# Patient Record
Sex: MALE | Race: WHITE | Employment: OTHER | URBAN - METROPOLITAN AREA
[De-identification: names, ages, dates, MRNs, and addresses within clinical notes are randomized per-mention and may not be internally consistent; named-entity substitution may affect disease eponyms.]

---

## 2018-04-23 ENCOUNTER — APPOINTMENT (OUTPATIENT)
Dept: MRI IMAGING | Age: 58
DRG: 045 | End: 2018-04-23
Payer: MEDICAID

## 2018-04-23 ENCOUNTER — APPOINTMENT (OUTPATIENT)
Dept: CT IMAGING | Age: 58
DRG: 045 | End: 2018-04-23
Payer: MEDICAID

## 2018-04-23 ENCOUNTER — APPOINTMENT (OUTPATIENT)
Dept: ULTRASOUND IMAGING | Age: 58
DRG: 045 | End: 2018-04-23
Payer: MEDICAID

## 2018-04-23 ENCOUNTER — HOSPITAL ENCOUNTER (INPATIENT)
Age: 58
LOS: 2 days | Discharge: HOME | DRG: 045 | End: 2018-04-25
Attending: EMERGENCY MEDICINE | Admitting: INTERNAL MEDICINE
Payer: MEDICAID

## 2018-04-23 DIAGNOSIS — I63.00 CEREBROVASCULAR ACCIDENT (CVA) DUE TO THROMBOSIS OF PRECEREBRAL ARTERY (HCC): Primary | ICD-10-CM

## 2018-04-23 DIAGNOSIS — I63.9 ACUTE CEREBROVASCULAR ACCIDENT (CVA) (HCC): ICD-10-CM

## 2018-04-23 LAB
ALBUMIN SERPL-MCNC: 3.7 G/DL (ref 3.9–4.9)
ALP BLD-CCNC: 115 U/L (ref 35–104)
ALT SERPL-CCNC: 39 U/L (ref 0–41)
AMPHETAMINE SCREEN, URINE: NORMAL
ANION GAP SERPL CALCULATED.3IONS-SCNC: 13 MEQ/L (ref 7–13)
ANISOCYTOSIS: ABNORMAL
ANTISTREPTOLYSIN-O: 87 IU/ML (ref 0–200)
AST SERPL-CCNC: 56 U/L (ref 0–40)
ATYPICAL LYMPHOCYTE RELATIVE PERCENT: 4 %
BARBITURATE SCREEN URINE: NORMAL
BASOPHILS ABSOLUTE: 0.1 K/UL (ref 0–0.2)
BASOPHILS RELATIVE PERCENT: 2 %
BENZODIAZEPINE SCREEN, URINE: NORMAL
BILIRUB SERPL-MCNC: 0.6 MG/DL (ref 0–1.2)
BILIRUBIN URINE: NEGATIVE
BLOOD, URINE: NEGATIVE
BUN BLDV-MCNC: 13 MG/DL (ref 6–20)
CALCIUM SERPL-MCNC: 9.2 MG/DL (ref 8.6–10.2)
CANNABINOID SCREEN URINE: NORMAL
CHLORIDE BLD-SCNC: 101 MEQ/L (ref 98–107)
CLARITY: CLEAR
CO2: 22 MEQ/L (ref 22–29)
COCAINE METABOLITE SCREEN URINE: NORMAL
COLOR: YELLOW
CREAT SERPL-MCNC: 0.84 MG/DL (ref 0.7–1.2)
EKG ATRIAL RATE: 77 BPM
EKG P AXIS: 19 DEGREES
EKG P-R INTERVAL: 144 MS
EKG Q-T INTERVAL: 354 MS
EKG QRS DURATION: 66 MS
EKG QTC CALCULATION (BAZETT): 400 MS
EKG R AXIS: 48 DEGREES
EKG T AXIS: 56 DEGREES
EKG VENTRICULAR RATE: 77 BPM
EOSINOPHILS ABSOLUTE: 0.2 K/UL (ref 0–0.7)
EOSINOPHILS RELATIVE PERCENT: 4 %
ETHANOL PERCENT: NORMAL G/DL
ETHANOL: <10 MG/DL (ref 0–0.08)
GFR AFRICAN AMERICAN: >60
GFR NON-AFRICAN AMERICAN: >60
GLOBULIN: 3.1 G/DL (ref 2.3–3.5)
GLUCOSE BLD-MCNC: 140 MG/DL (ref 60–115)
GLUCOSE BLD-MCNC: 140 MG/DL (ref 60–115)
GLUCOSE BLD-MCNC: 229 MG/DL (ref 74–109)
GLUCOSE URINE: 100 MG/DL
HCT VFR BLD CALC: 38.5 % (ref 42–52)
HEMOGLOBIN: 13.4 G/DL (ref 14–18)
HYPOCHROMIA: 0
KETONES, URINE: NEGATIVE MG/DL
LEUKOCYTE ESTERASE, URINE: NEGATIVE
LYMPHOCYTES ABSOLUTE: 1.9 K/UL (ref 1–4.8)
LYMPHOCYTES RELATIVE PERCENT: 44 %
Lab: NORMAL
MACROCYTES: 0
MAGNESIUM: 1.6 MG/DL (ref 1.7–2.3)
MCH RBC QN AUTO: 30.4 PG (ref 27–31.3)
MCHC RBC AUTO-ENTMCNC: 34.8 % (ref 33–37)
MCV RBC AUTO: 87.4 FL (ref 80–100)
MICROCYTES: 0
MONOCYTES ABSOLUTE: 0.1 K/UL (ref 0.2–0.8)
MONOCYTES RELATIVE PERCENT: 3 %
NEUTROPHILS ABSOLUTE: 1.7 K/UL (ref 1.4–6.5)
NEUTROPHILS RELATIVE PERCENT: 43 %
NITRITE, URINE: NEGATIVE
OPIATE SCREEN URINE: NORMAL
PDW BLD-RTO: 13.2 % (ref 11.5–14.5)
PERFORMED ON: ABNORMAL
PERFORMED ON: ABNORMAL
PH UA: 5.5 (ref 5–9)
PHENCYCLIDINE SCREEN URINE: NORMAL
PLATELET # BLD: 88 K/UL (ref 130–400)
PLATELET SLIDE REVIEW: ABNORMAL
POIKILOCYTES: 0
POLYCHROMASIA: 0
POTASSIUM SERPL-SCNC: 4.5 MEQ/L (ref 3.5–5.1)
PROTEIN UA: NEGATIVE MG/DL
RBC # BLD: 4.4 M/UL (ref 4.7–6.1)
SLIDE REVIEW: ABNORMAL
SMUDGE CELLS: 3.9
SODIUM BLD-SCNC: 136 MEQ/L (ref 132–144)
SPECIFIC GRAVITY UA: 1.02 (ref 1–1.03)
TOTAL PROTEIN: 6.8 G/DL (ref 6.4–8.1)
TROPONIN: <0.01 NG/ML (ref 0–0.01)
UROBILINOGEN, URINE: 1 E.U./DL
WBC # BLD: 3.9 K/UL (ref 4.8–10.8)

## 2018-04-23 PROCEDURE — 99285 EMERGENCY DEPT VISIT HI MDM: CPT

## 2018-04-23 PROCEDURE — 6370000000 HC RX 637 (ALT 250 FOR IP): Performed by: EMERGENCY MEDICINE

## 2018-04-23 PROCEDURE — 84484 ASSAY OF TROPONIN QUANT: CPT

## 2018-04-23 PROCEDURE — 70551 MRI BRAIN STEM W/O DYE: CPT

## 2018-04-23 PROCEDURE — 6370000000 HC RX 637 (ALT 250 FOR IP): Performed by: NURSE PRACTITIONER

## 2018-04-23 PROCEDURE — 93880 EXTRACRANIAL BILAT STUDY: CPT

## 2018-04-23 PROCEDURE — 70450 CT HEAD/BRAIN W/O DYE: CPT

## 2018-04-23 PROCEDURE — 36415 COLL VENOUS BLD VENIPUNCTURE: CPT

## 2018-04-23 PROCEDURE — 83735 ASSAY OF MAGNESIUM: CPT

## 2018-04-23 PROCEDURE — 85025 COMPLETE CBC W/AUTO DIFF WBC: CPT

## 2018-04-23 PROCEDURE — 2580000003 HC RX 258: Performed by: NURSE PRACTITIONER

## 2018-04-23 PROCEDURE — 2580000003 HC RX 258: Performed by: EMERGENCY MEDICINE

## 2018-04-23 PROCEDURE — 80053 COMPREHEN METABOLIC PANEL: CPT

## 2018-04-23 PROCEDURE — 1210000000 HC MED SURG R&B

## 2018-04-23 PROCEDURE — 70544 MR ANGIOGRAPHY HEAD W/O DYE: CPT

## 2018-04-23 PROCEDURE — 86060 ANTISTREPTOLYSIN O TITER: CPT

## 2018-04-23 PROCEDURE — G0480 DRUG TEST DEF 1-7 CLASSES: HCPCS

## 2018-04-23 PROCEDURE — 80307 DRUG TEST PRSMV CHEM ANLYZR: CPT

## 2018-04-23 PROCEDURE — 81003 URINALYSIS AUTO W/O SCOPE: CPT

## 2018-04-23 RX ORDER — ACETAMINOPHEN 325 MG/1
650 TABLET ORAL EVERY 4 HOURS PRN
Status: DISCONTINUED | OUTPATIENT
Start: 2018-04-23 | End: 2018-04-25 | Stop reason: HOSPADM

## 2018-04-23 RX ORDER — ONDANSETRON 2 MG/ML
4 INJECTION INTRAMUSCULAR; INTRAVENOUS EVERY 6 HOURS PRN
Status: DISCONTINUED | OUTPATIENT
Start: 2018-04-23 | End: 2018-04-25 | Stop reason: HOSPADM

## 2018-04-23 RX ORDER — DEXTROSE MONOHYDRATE 50 MG/ML
100 INJECTION, SOLUTION INTRAVENOUS PRN
Status: DISCONTINUED | OUTPATIENT
Start: 2018-04-23 | End: 2018-04-25 | Stop reason: HOSPADM

## 2018-04-23 RX ORDER — ASPIRIN 81 MG/1
81 TABLET ORAL DAILY
Status: DISCONTINUED | OUTPATIENT
Start: 2018-04-24 | End: 2018-04-25 | Stop reason: HOSPADM

## 2018-04-23 RX ORDER — ATORVASTATIN CALCIUM 40 MG/1
40 TABLET, FILM COATED ORAL NIGHTLY
Status: DISCONTINUED | OUTPATIENT
Start: 2018-04-23 | End: 2018-04-25 | Stop reason: HOSPADM

## 2018-04-23 RX ORDER — LISINOPRIL 20 MG/1
20 TABLET ORAL DAILY
Status: ON HOLD | COMMUNITY
End: 2021-01-01 | Stop reason: HOSPADM

## 2018-04-23 RX ORDER — GLYBURIDE 5 MG/1
10 TABLET ORAL 2 TIMES DAILY WITH MEALS
Status: ON HOLD | COMMUNITY
End: 2021-01-01 | Stop reason: HOSPADM

## 2018-04-23 RX ORDER — HYDRALAZINE HYDROCHLORIDE 20 MG/ML
10 INJECTION INTRAMUSCULAR; INTRAVENOUS EVERY 6 HOURS PRN
Status: DISCONTINUED | OUTPATIENT
Start: 2018-04-23 | End: 2018-04-25 | Stop reason: HOSPADM

## 2018-04-23 RX ORDER — ASPIRIN 325 MG
325 TABLET ORAL ONCE
Status: COMPLETED | OUTPATIENT
Start: 2018-04-23 | End: 2018-04-23

## 2018-04-23 RX ORDER — NICOTINE POLACRILEX 4 MG
15 LOZENGE BUCCAL PRN
Status: DISCONTINUED | OUTPATIENT
Start: 2018-04-23 | End: 2018-04-25 | Stop reason: HOSPADM

## 2018-04-23 RX ORDER — SODIUM CHLORIDE 0.9 % (FLUSH) 0.9 %
10 SYRINGE (ML) INJECTION PRN
Status: DISCONTINUED | OUTPATIENT
Start: 2018-04-23 | End: 2018-04-25 | Stop reason: HOSPADM

## 2018-04-23 RX ORDER — DEXTROSE MONOHYDRATE 25 G/50ML
12.5 INJECTION, SOLUTION INTRAVENOUS PRN
Status: DISCONTINUED | OUTPATIENT
Start: 2018-04-23 | End: 2018-04-25 | Stop reason: HOSPADM

## 2018-04-23 RX ORDER — LISINOPRIL 20 MG/1
20 TABLET ORAL DAILY
Status: DISCONTINUED | OUTPATIENT
Start: 2018-04-23 | End: 2018-04-25 | Stop reason: HOSPADM

## 2018-04-23 RX ORDER — 0.9 % SODIUM CHLORIDE 0.9 %
1000 INTRAVENOUS SOLUTION INTRAVENOUS ONCE
Status: COMPLETED | OUTPATIENT
Start: 2018-04-23 | End: 2018-04-23

## 2018-04-23 RX ORDER — SODIUM CHLORIDE 0.9 % (FLUSH) 0.9 %
10 SYRINGE (ML) INJECTION EVERY 12 HOURS SCHEDULED
Status: DISCONTINUED | OUTPATIENT
Start: 2018-04-23 | End: 2018-04-25 | Stop reason: HOSPADM

## 2018-04-23 RX ADMIN — SODIUM CHLORIDE 1000 ML: 9 INJECTION, SOLUTION INTRAVENOUS at 13:20

## 2018-04-23 RX ADMIN — ATORVASTATIN CALCIUM 40 MG: 40 TABLET, FILM COATED ORAL at 21:18

## 2018-04-23 RX ADMIN — LISINOPRIL 20 MG: 20 TABLET ORAL at 21:18

## 2018-04-23 RX ADMIN — INSULIN LISPRO 2 UNITS: 100 INJECTION, SOLUTION INTRAVENOUS; SUBCUTANEOUS at 17:26

## 2018-04-23 RX ADMIN — ASPIRIN 325 MG: 325 TABLET, COATED ORAL at 14:51

## 2018-04-23 RX ADMIN — Medication 10 ML: at 21:18

## 2018-04-23 ASSESSMENT — PAIN SCALES - GENERAL
PAINLEVEL_OUTOF10: 0

## 2018-04-23 ASSESSMENT — PATIENT HEALTH QUESTIONNAIRE - PHQ9: SUM OF ALL RESPONSES TO PHQ QUESTIONS 1-9: 10

## 2018-04-23 ASSESSMENT — ENCOUNTER SYMPTOMS
ABDOMINAL PAIN: 0
NAUSEA: 0
SORE THROAT: 0
COUGH: 0
SHORTNESS OF BREATH: 0
VOMITING: 0
BACK PAIN: 0
DIARRHEA: 0

## 2018-04-24 LAB
ANION GAP SERPL CALCULATED.3IONS-SCNC: 15 MEQ/L (ref 7–13)
BUN BLDV-MCNC: 12 MG/DL (ref 6–20)
CALCIUM SERPL-MCNC: 9.1 MG/DL (ref 8.6–10.2)
CHLORIDE BLD-SCNC: 102 MEQ/L (ref 98–107)
CHOLESTEROL, TOTAL: 185 MG/DL (ref 0–199)
CO2: 21 MEQ/L (ref 22–29)
CREAT SERPL-MCNC: 0.79 MG/DL (ref 0.7–1.2)
GFR AFRICAN AMERICAN: >60
GFR NON-AFRICAN AMERICAN: >60
GLUCOSE BLD-MCNC: 160 MG/DL (ref 60–115)
GLUCOSE BLD-MCNC: 164 MG/DL (ref 74–109)
GLUCOSE BLD-MCNC: 176 MG/DL (ref 60–115)
GLUCOSE BLD-MCNC: 197 MG/DL (ref 60–115)
GLUCOSE BLD-MCNC: 228 MG/DL (ref 60–115)
HCT VFR BLD CALC: 37.8 % (ref 42–52)
HDLC SERPL-MCNC: 33 MG/DL (ref 40–59)
HEMOGLOBIN: 12.9 G/DL (ref 14–18)
LDL CHOLESTEROL CALCULATED: 109 MG/DL (ref 0–129)
MCH RBC QN AUTO: 29.9 PG (ref 27–31.3)
MCHC RBC AUTO-ENTMCNC: 34.2 % (ref 33–37)
MCV RBC AUTO: 87.4 FL (ref 80–100)
PDW BLD-RTO: 13.3 % (ref 11.5–14.5)
PERFORMED ON: ABNORMAL
PLATELET # BLD: 84 K/UL (ref 130–400)
POTASSIUM REFLEX MAGNESIUM: 4.2 MEQ/L (ref 3.5–5.1)
RBC # BLD: 4.32 M/UL (ref 4.7–6.1)
SODIUM BLD-SCNC: 138 MEQ/L (ref 132–144)
TRIGL SERPL-MCNC: 214 MG/DL (ref 0–200)
WBC # BLD: 4.5 K/UL (ref 4.8–10.8)

## 2018-04-24 PROCEDURE — 6360000002 HC RX W HCPCS: Performed by: NURSE PRACTITIONER

## 2018-04-24 PROCEDURE — G8979 MOBILITY GOAL STATUS: HCPCS

## 2018-04-24 PROCEDURE — G8988 SELF CARE GOAL STATUS: HCPCS

## 2018-04-24 PROCEDURE — 1210000000 HC MED SURG R&B

## 2018-04-24 PROCEDURE — 97166 OT EVAL MOD COMPLEX 45 MIN: CPT

## 2018-04-24 PROCEDURE — G8978 MOBILITY CURRENT STATUS: HCPCS

## 2018-04-24 PROCEDURE — 80048 BASIC METABOLIC PNL TOTAL CA: CPT

## 2018-04-24 PROCEDURE — G8987 SELF CARE CURRENT STATUS: HCPCS

## 2018-04-24 PROCEDURE — 2580000003 HC RX 258: Performed by: NURSE PRACTITIONER

## 2018-04-24 PROCEDURE — 80061 LIPID PANEL: CPT

## 2018-04-24 PROCEDURE — 99253 IP/OBS CNSLTJ NEW/EST LOW 45: CPT | Performed by: CLINICAL NURSE SPECIALIST

## 2018-04-24 PROCEDURE — 6370000000 HC RX 637 (ALT 250 FOR IP): Performed by: NURSE PRACTITIONER

## 2018-04-24 PROCEDURE — 85027 COMPLETE CBC AUTOMATED: CPT

## 2018-04-24 PROCEDURE — 6370000000 HC RX 637 (ALT 250 FOR IP): Performed by: PSYCHIATRY & NEUROLOGY

## 2018-04-24 PROCEDURE — 36415 COLL VENOUS BLD VENIPUNCTURE: CPT

## 2018-04-24 PROCEDURE — 97163 PT EVAL HIGH COMPLEX 45 MIN: CPT

## 2018-04-24 PROCEDURE — 99254 IP/OBS CNSLTJ NEW/EST MOD 60: CPT | Performed by: INTERNAL MEDICINE

## 2018-04-24 RX ORDER — CLOPIDOGREL BISULFATE 75 MG/1
75 TABLET ORAL DAILY
Status: DISCONTINUED | OUTPATIENT
Start: 2018-04-24 | End: 2018-04-25 | Stop reason: HOSPADM

## 2018-04-24 RX ORDER — VENLAFAXINE HYDROCHLORIDE 37.5 MG/1
37.5 CAPSULE, EXTENDED RELEASE ORAL
Status: DISCONTINUED | OUTPATIENT
Start: 2018-04-25 | End: 2018-04-25 | Stop reason: HOSPADM

## 2018-04-24 RX ORDER — MAGNESIUM SULFATE IN WATER 40 MG/ML
2 INJECTION, SOLUTION INTRAVENOUS ONCE
Status: COMPLETED | OUTPATIENT
Start: 2018-04-24 | End: 2018-04-24

## 2018-04-24 RX ADMIN — INSULIN LISPRO 2 UNITS: 100 INJECTION, SOLUTION INTRAVENOUS; SUBCUTANEOUS at 10:28

## 2018-04-24 RX ADMIN — ASPIRIN 81 MG: 81 TABLET, COATED ORAL at 10:28

## 2018-04-24 RX ADMIN — INSULIN LISPRO 2 UNITS: 100 INJECTION, SOLUTION INTRAVENOUS; SUBCUTANEOUS at 17:11

## 2018-04-24 RX ADMIN — LISINOPRIL 20 MG: 20 TABLET ORAL at 10:28

## 2018-04-24 RX ADMIN — Medication 10 ML: at 10:35

## 2018-04-24 RX ADMIN — ATORVASTATIN CALCIUM 40 MG: 40 TABLET, FILM COATED ORAL at 20:22

## 2018-04-24 RX ADMIN — Medication 10 ML: at 20:21

## 2018-04-24 RX ADMIN — INSULIN LISPRO 4 UNITS: 100 INJECTION, SOLUTION INTRAVENOUS; SUBCUTANEOUS at 12:53

## 2018-04-24 RX ADMIN — CLOPIDOGREL BISULFATE 75 MG: 75 TABLET ORAL at 12:53

## 2018-04-24 RX ADMIN — MAGNESIUM SULFATE HEPTAHYDRATE 2 G: 40 INJECTION, SOLUTION INTRAVENOUS at 12:53

## 2018-04-24 ASSESSMENT — PAIN SCALES - GENERAL
PAINLEVEL_OUTOF10: 0

## 2018-04-24 ASSESSMENT — ENCOUNTER SYMPTOMS
APNEA: 0
EYES NEGATIVE: 1
ALLERGIC/IMMUNOLOGIC NEGATIVE: 1
RESPIRATORY NEGATIVE: 1
WHEEZING: 0
COUGH: 0
DIARRHEA: 0
STRIDOR: 0
SHORTNESS OF BREATH: 0
NAUSEA: 0
CHEST TIGHTNESS: 0
BLOOD IN STOOL: 0
VOMITING: 0
CHOKING: 0
GASTROINTESTINAL NEGATIVE: 1

## 2018-04-25 ENCOUNTER — APPOINTMENT (OUTPATIENT)
Dept: CARDIAC CATH/INVASIVE PROCEDURES | Age: 58
DRG: 045 | End: 2018-04-25
Payer: MEDICAID

## 2018-04-25 VITALS
WEIGHT: 278 LBS | RESPIRATION RATE: 19 BRPM | DIASTOLIC BLOOD PRESSURE: 60 MMHG | HEART RATE: 72 BPM | TEMPERATURE: 98.3 F | HEIGHT: 68 IN | BODY MASS INDEX: 42.13 KG/M2 | OXYGEN SATURATION: 96 % | SYSTOLIC BLOOD PRESSURE: 115 MMHG

## 2018-04-25 LAB
ANION GAP SERPL CALCULATED.3IONS-SCNC: 14 MEQ/L (ref 7–13)
BUN BLDV-MCNC: 15 MG/DL (ref 6–20)
CALCIUM SERPL-MCNC: 9.4 MG/DL (ref 8.6–10.2)
CHLORIDE BLD-SCNC: 99 MEQ/L (ref 98–107)
CO2: 22 MEQ/L (ref 22–29)
CREAT SERPL-MCNC: 0.91 MG/DL (ref 0.7–1.2)
GFR AFRICAN AMERICAN: >60
GFR NON-AFRICAN AMERICAN: >60
GLUCOSE BLD-MCNC: 208 MG/DL (ref 60–115)
GLUCOSE BLD-MCNC: 224 MG/DL (ref 74–109)
GLUCOSE BLD-MCNC: 376 MG/DL (ref 60–115)
LV EF: 60 %
LVEF MODALITY: NORMAL
MAGNESIUM: 1.7 MG/DL (ref 1.7–2.3)
PERFORMED ON: ABNORMAL
PERFORMED ON: ABNORMAL
POTASSIUM SERPL-SCNC: 4.1 MEQ/L (ref 3.5–5.1)
SODIUM BLD-SCNC: 135 MEQ/L (ref 132–144)

## 2018-04-25 PROCEDURE — 93325 DOPPLER ECHO COLOR FLOW MAPG: CPT

## 2018-04-25 PROCEDURE — 6370000000 HC RX 637 (ALT 250 FOR IP): Performed by: INTERNAL MEDICINE

## 2018-04-25 PROCEDURE — 93321 DOPPLER ECHO F-UP/LMTD STD: CPT

## 2018-04-25 PROCEDURE — 36415 COLL VENOUS BLD VENIPUNCTURE: CPT

## 2018-04-25 PROCEDURE — 80048 BASIC METABOLIC PNL TOTAL CA: CPT

## 2018-04-25 PROCEDURE — 83735 ASSAY OF MAGNESIUM: CPT

## 2018-04-25 PROCEDURE — 93312 ECHO TRANSESOPHAGEAL: CPT

## 2018-04-25 PROCEDURE — 6370000000 HC RX 637 (ALT 250 FOR IP): Performed by: NURSE PRACTITIONER

## 2018-04-25 PROCEDURE — 6370000000 HC RX 637 (ALT 250 FOR IP): Performed by: CLINICAL NURSE SPECIALIST

## 2018-04-25 PROCEDURE — 6370000000 HC RX 637 (ALT 250 FOR IP): Performed by: PSYCHIATRY & NEUROLOGY

## 2018-04-25 PROCEDURE — 97116 GAIT TRAINING THERAPY: CPT

## 2018-04-25 PROCEDURE — 6360000002 HC RX W HCPCS: Performed by: INTERNAL MEDICINE

## 2018-04-25 PROCEDURE — 93312 ECHO TRANSESOPHAGEAL: CPT | Performed by: INTERNAL MEDICINE

## 2018-04-25 PROCEDURE — 97535 SELF CARE MNGMENT TRAINING: CPT

## 2018-04-25 RX ORDER — SODIUM CHLORIDE 0.9 % (FLUSH) 0.9 %
10 SYRINGE (ML) INJECTION PRN
Status: DISCONTINUED | OUTPATIENT
Start: 2018-04-25 | End: 2018-04-25 | Stop reason: HOSPADM

## 2018-04-25 RX ORDER — SODIUM CHLORIDE 0.9 % (FLUSH) 0.9 %
10 SYRINGE (ML) INJECTION EVERY 12 HOURS SCHEDULED
Status: DISCONTINUED | OUTPATIENT
Start: 2018-04-25 | End: 2018-04-25 | Stop reason: HOSPADM

## 2018-04-25 RX ORDER — ASPIRIN 81 MG/1
81 TABLET ORAL DAILY
Qty: 30 TABLET | Refills: 3 | Status: ON HOLD | OUTPATIENT
Start: 2018-04-26 | End: 2021-01-01 | Stop reason: HOSPADM

## 2018-04-25 RX ORDER — CLOPIDOGREL BISULFATE 75 MG/1
75 TABLET ORAL DAILY
Qty: 30 TABLET | Refills: 3 | Status: ON HOLD | OUTPATIENT
Start: 2018-04-26 | End: 2021-01-01 | Stop reason: HOSPADM

## 2018-04-25 RX ORDER — SODIUM CHLORIDE 9 MG/ML
INJECTION, SOLUTION INTRAVENOUS CONTINUOUS
Status: DISCONTINUED | OUTPATIENT
Start: 2018-04-25 | End: 2018-04-25 | Stop reason: HOSPADM

## 2018-04-25 RX ORDER — MIDAZOLAM HYDROCHLORIDE 1 MG/ML
INJECTION INTRAMUSCULAR; INTRAVENOUS
Status: COMPLETED | OUTPATIENT
Start: 2018-04-25 | End: 2018-04-25

## 2018-04-25 RX ORDER — BACTERIOSTATIC SODIUM CHLORIDE 0.9 %
VIAL (ML) INJECTION
Status: DISCONTINUED
Start: 2018-04-25 | End: 2018-04-25 | Stop reason: HOSPADM

## 2018-04-25 RX ORDER — FENTANYL CITRATE 50 UG/ML
100 INJECTION, SOLUTION INTRAMUSCULAR; INTRAVENOUS ONCE
Status: DISCONTINUED | OUTPATIENT
Start: 2018-04-25 | End: 2018-04-25 | Stop reason: HOSPADM

## 2018-04-25 RX ORDER — MIDAZOLAM HYDROCHLORIDE 1 MG/ML
5 INJECTION INTRAMUSCULAR; INTRAVENOUS ONCE
Status: DISCONTINUED | OUTPATIENT
Start: 2018-04-25 | End: 2018-04-25 | Stop reason: HOSPADM

## 2018-04-25 RX ORDER — ATORVASTATIN CALCIUM 40 MG/1
40 TABLET, FILM COATED ORAL NIGHTLY
Qty: 30 TABLET | Refills: 3 | Status: ON HOLD | OUTPATIENT
Start: 2018-04-25 | End: 2021-01-01 | Stop reason: HOSPADM

## 2018-04-25 RX ORDER — VENLAFAXINE HYDROCHLORIDE 37.5 MG/1
37.5 CAPSULE, EXTENDED RELEASE ORAL
Qty: 30 CAPSULE | Refills: 3 | Status: ON HOLD | OUTPATIENT
Start: 2018-04-26 | End: 2021-01-01 | Stop reason: HOSPADM

## 2018-04-25 RX ADMIN — ASPIRIN 81 MG: 81 TABLET, COATED ORAL at 09:53

## 2018-04-25 RX ADMIN — LISINOPRIL 20 MG: 20 TABLET ORAL at 09:53

## 2018-04-25 RX ADMIN — LIDOCAINE HYDROCHLORIDE 15 ML: 20 SOLUTION ORAL; TOPICAL at 08:15

## 2018-04-25 RX ADMIN — MIDAZOLAM HYDROCHLORIDE 2 MG: 1 INJECTION, SOLUTION INTRAMUSCULAR; INTRAVENOUS at 08:18

## 2018-04-25 RX ADMIN — FENTANYL CITRATE 50 MCG: 50 INJECTION, SOLUTION INTRAMUSCULAR; INTRAVENOUS at 08:19

## 2018-04-25 RX ADMIN — CLOPIDOGREL BISULFATE 75 MG: 75 TABLET ORAL at 09:53

## 2018-04-25 RX ADMIN — INSULIN LISPRO 4 UNITS: 100 INJECTION, SOLUTION INTRAVENOUS; SUBCUTANEOUS at 09:57

## 2018-04-25 RX ADMIN — INSULIN LISPRO 10 UNITS: 100 INJECTION, SOLUTION INTRAVENOUS; SUBCUTANEOUS at 11:49

## 2018-04-25 RX ADMIN — VENLAFAXINE HYDROCHLORIDE 37.5 MG: 37.5 CAPSULE, EXTENDED RELEASE ORAL at 09:53

## 2018-04-25 ASSESSMENT — PAIN SCALES - GENERAL
PAINLEVEL_OUTOF10: 0

## 2018-07-29 ENCOUNTER — HOSPITAL ENCOUNTER (EMERGENCY)
Age: 58
Discharge: HOME | End: 2018-07-29
Payer: MEDICAID

## 2018-07-29 ENCOUNTER — APPOINTMENT (OUTPATIENT)
Dept: CT IMAGING | Age: 58
End: 2018-07-29
Payer: MEDICAID

## 2018-07-29 VITALS
SYSTOLIC BLOOD PRESSURE: 125 MMHG | DIASTOLIC BLOOD PRESSURE: 70 MMHG | HEIGHT: 69 IN | BODY MASS INDEX: 40.29 KG/M2 | WEIGHT: 272 LBS | HEART RATE: 83 BPM | OXYGEN SATURATION: 98 % | RESPIRATION RATE: 16 BRPM | TEMPERATURE: 98 F

## 2018-07-29 DIAGNOSIS — S09.90XA INJURY OF HEAD, INITIAL ENCOUNTER: ICD-10-CM

## 2018-07-29 DIAGNOSIS — T14.8XXA ABRASION: Primary | ICD-10-CM

## 2018-07-29 PROCEDURE — 70450 CT HEAD/BRAIN W/O DYE: CPT

## 2018-07-29 PROCEDURE — 99283 EMERGENCY DEPT VISIT LOW MDM: CPT

## 2018-07-29 ASSESSMENT — ENCOUNTER SYMPTOMS
VOMITING: 0
NAUSEA: 0

## 2018-07-29 NOTE — ED PROVIDER NOTES
lb (123.4 kg)   Height: 5' 9\" (1.753 m)         REASSESSMENT            MDM    PROCEDURES:    Procedures      FINAL IMPRESSION      1. Abrasion    2. Injury of head, initial encounter          DISPOSITION/PLAN   DISPOSITION Decision To Discharge 07/29/2018 06:05:14 PM  Nursing notes, medical records and triage notes reviewed. Vital signs reviewed. This is a 60year old male with a PMH of multiple CVAs (on anticoagulants) who present to the emergency department for acute abrasion/scratch to forehead that he discovered this morning. CT negative for acute bleed. Recommended follow up with PCP tomorrow. Family members and patient were given the warning signs to return immediately to the ED.      The patient and/or family  -had the results of all tests and diagnosis explained to them  -Given both verbal and written discharge instructions  -Were instructed of the importance of close follow-up  -Were told that close follow-up is essential for good health and good outcomes    PATIENT REFERRED TO:  Alexus De Jesus, [address]  [address] [place] [id number] [phone]    Call in 1 day        DISCHARGE MEDICATIONS:  Discharge Medication List as of 7/29/2018  6:07 PM          (Please note that portions of this note were completed with a voice recognition program.  Efforts were made to edit the dictations but occasionally words are mis-transcribed.)    MARISELA Looney PA-C  07/29/18 2905

## 2018-07-29 NOTE — ED TRIAGE NOTES
Patient is alert and oriented X 4. Patient states he lives with family. Patient states with scratch on forehead. No bleeding at this time. Patient denies injury. Patient denies dizziness or chest shannon.  m

## 2021-01-01 ENCOUNTER — TELEPHONE (OUTPATIENT)
Dept: INTERVENTIONAL RADIOLOGY/VASCULAR | Age: 61
End: 2021-01-01

## 2021-01-01 ENCOUNTER — APPOINTMENT (OUTPATIENT)
Dept: CT IMAGING | Age: 61
DRG: 432 | End: 2021-01-01
Payer: MEDICARE

## 2021-01-01 ENCOUNTER — CARE COORDINATION (OUTPATIENT)
Dept: CASE MANAGEMENT | Age: 61
End: 2021-01-01

## 2021-01-01 ENCOUNTER — APPOINTMENT (OUTPATIENT)
Dept: GENERAL RADIOLOGY | Age: 61
DRG: 432 | End: 2021-01-01
Payer: MEDICARE

## 2021-01-01 ENCOUNTER — APPOINTMENT (OUTPATIENT)
Dept: ULTRASOUND IMAGING | Age: 61
DRG: 432 | End: 2021-01-01
Payer: MEDICARE

## 2021-01-01 ENCOUNTER — HOSPITAL ENCOUNTER (OUTPATIENT)
Dept: CT IMAGING | Age: 61
Discharge: HOME | End: 2021-04-25
Payer: MEDICARE

## 2021-01-01 ENCOUNTER — HOSPITAL ENCOUNTER (INPATIENT)
Age: 61
LOS: 5 days | Discharge: NURSING, REHAB OR LONG-TERM CARE | DRG: 432 | End: 2021-04-14
Attending: EMERGENCY MEDICINE | Admitting: INTERNAL MEDICINE
Payer: MEDICARE

## 2021-01-01 ENCOUNTER — VIRTUAL VISIT (OUTPATIENT)
Dept: INTERVENTIONAL RADIOLOGY/VASCULAR | Age: 61
End: 2021-01-01
Payer: MEDICARE

## 2021-01-01 ENCOUNTER — HOSPITAL ENCOUNTER (INPATIENT)
Age: 61
LOS: 1 days | DRG: 432 | End: 2021-05-02
Attending: FAMILY MEDICINE | Admitting: INTERNAL MEDICINE
Payer: MEDICARE

## 2021-01-01 ENCOUNTER — APPOINTMENT (OUTPATIENT)
Dept: INTERVENTIONAL RADIOLOGY/VASCULAR | Age: 61
DRG: 432 | End: 2021-01-01
Payer: MEDICARE

## 2021-01-01 VITALS
HEIGHT: 69 IN | BODY MASS INDEX: 38.51 KG/M2 | WEIGHT: 260 LBS | SYSTOLIC BLOOD PRESSURE: 124 MMHG | TEMPERATURE: 97.9 F | DIASTOLIC BLOOD PRESSURE: 77 MMHG | RESPIRATION RATE: 18 BRPM | HEART RATE: 136 BPM | OXYGEN SATURATION: 56 %

## 2021-01-01 VITALS
TEMPERATURE: 97.5 F | HEART RATE: 95 BPM | RESPIRATION RATE: 18 BRPM | WEIGHT: 240 LBS | HEIGHT: 69 IN | SYSTOLIC BLOOD PRESSURE: 118 MMHG | BODY MASS INDEX: 35.55 KG/M2 | DIASTOLIC BLOOD PRESSURE: 78 MMHG | OXYGEN SATURATION: 95 %

## 2021-01-01 VITALS
HEART RATE: 91 BPM | DIASTOLIC BLOOD PRESSURE: 87 MMHG | OXYGEN SATURATION: 98 % | SYSTOLIC BLOOD PRESSURE: 144 MMHG | RESPIRATION RATE: 18 BRPM

## 2021-01-01 DIAGNOSIS — R93.5 ABNORMAL CT OF THE ABDOMEN: ICD-10-CM

## 2021-01-01 DIAGNOSIS — N28.9 RENAL INSUFFICIENCY: ICD-10-CM

## 2021-01-01 DIAGNOSIS — G93.41 METABOLIC ENCEPHALOPATHY: Primary | ICD-10-CM

## 2021-01-01 DIAGNOSIS — R41.0 INTERMITTENT CONFUSION: ICD-10-CM

## 2021-01-01 DIAGNOSIS — R53.1 GENERALIZED WEAKNESS: ICD-10-CM

## 2021-01-01 DIAGNOSIS — K74.60 CIRRHOSIS OF LIVER WITH ASCITES, UNSPECIFIED HEPATIC CIRRHOSIS TYPE (HCC): ICD-10-CM

## 2021-01-01 DIAGNOSIS — R59.0 RETROPERITONEAL LYMPHADENOPATHY: Primary | ICD-10-CM

## 2021-01-01 DIAGNOSIS — R59.0 RETROPERITONEAL LYMPHADENOPATHY: ICD-10-CM

## 2021-01-01 DIAGNOSIS — C79.9 METASTATIC CARCINOMA (HCC): ICD-10-CM

## 2021-01-01 DIAGNOSIS — N39.0 ACUTE UTI: ICD-10-CM

## 2021-01-01 DIAGNOSIS — R18.8 CIRRHOSIS OF LIVER WITH ASCITES, UNSPECIFIED HEPATIC CIRRHOSIS TYPE (HCC): ICD-10-CM

## 2021-01-01 DIAGNOSIS — R18.8 ASCITES OF LIVER: Primary | ICD-10-CM

## 2021-01-01 LAB
AFP: 441.6 UG/L
ALBUMIN FLUID: 0.9 G/DL
ALBUMIN SERPL-MCNC: 2.6 G/DL (ref 3.5–4.6)
ALBUMIN SERPL-MCNC: 2.89 G/DL (ref 3.75–5.01)
ALBUMIN SERPL-MCNC: 2.9 G/DL (ref 3.5–4.6)
ALBUMIN SERPL-MCNC: 3 G/DL (ref 3.5–4.6)
ALBUMIN SERPL-MCNC: 3 G/DL (ref 3.5–4.6)
ALBUMIN SERPL-MCNC: 3.2 G/DL (ref 3.5–4.6)
ALP BLD-CCNC: 142 U/L (ref 35–104)
ALP BLD-CCNC: 194 U/L (ref 35–104)
ALP BLD-CCNC: 209 U/L (ref 35–104)
ALP BLD-CCNC: 236 U/L (ref 35–104)
ALP BLD-CCNC: 244 U/L (ref 35–104)
ALPHA-1-GLOBULIN: 0.38 G/DL (ref 0.19–0.46)
ALPHA-2-GLOBULIN: 0.61 G/DL (ref 0.48–1.05)
ALT SERPL-CCNC: 15 U/L (ref 0–41)
ALT SERPL-CCNC: 18 U/L (ref 0–41)
ALT SERPL-CCNC: 19 U/L (ref 0–41)
ALT SERPL-CCNC: 25 U/L (ref 0–41)
ALT SERPL-CCNC: 26 U/L (ref 0–41)
AMMONIA: 115 UMOL/L (ref 16–60)
AMMONIA: 313 UMOL/L (ref 16–60)
AMMONIA: 45 UMOL/L (ref 16–60)
AMMONIA: 73 UMOL/L (ref 16–60)
AMYLASE FLUID: 135 U/L
ANION GAP SERPL CALCULATED.3IONS-SCNC: 10 MEQ/L (ref 9–15)
ANION GAP SERPL CALCULATED.3IONS-SCNC: 11 MEQ/L (ref 9–15)
ANION GAP SERPL CALCULATED.3IONS-SCNC: 12 MEQ/L (ref 9–15)
ANION GAP SERPL CALCULATED.3IONS-SCNC: 12 MEQ/L (ref 9–15)
ANION GAP SERPL CALCULATED.3IONS-SCNC: 14 MEQ/L (ref 9–15)
ANION GAP SERPL CALCULATED.3IONS-SCNC: 15 MEQ/L (ref 9–15)
ANION GAP SERPL CALCULATED.3IONS-SCNC: 16 MEQ/L (ref 9–15)
ANION GAP SERPL CALCULATED.3IONS-SCNC: 19 MEQ/L (ref 9–15)
APPEARANCE FLUID: ABNORMAL
AST SERPL-CCNC: 36 U/L (ref 0–40)
AST SERPL-CCNC: 40 U/L (ref 0–40)
AST SERPL-CCNC: 40 U/L (ref 0–40)
AST SERPL-CCNC: 52 U/L (ref 0–40)
AST SERPL-CCNC: 74 U/L (ref 0–40)
BACTERIA: NEGATIVE /HPF
BASE EXCESS ARTERIAL: -6 (ref -3–3)
BASOPHILS ABSOLUTE: 0 K/UL (ref 0–0.2)
BASOPHILS RELATIVE PERCENT: 0.2 %
BASOPHILS RELATIVE PERCENT: 0.3 %
BASOPHILS RELATIVE PERCENT: 0.3 %
BASOPHILS RELATIVE PERCENT: 0.5 %
BETA GLOBULIN: 0.83 G/DL (ref 0.48–1.1)
BILIRUB SERPL-MCNC: 1.6 MG/DL (ref 0.2–0.7)
BILIRUB SERPL-MCNC: 1.7 MG/DL (ref 0.2–0.7)
BILIRUB SERPL-MCNC: 2.5 MG/DL (ref 0.2–0.7)
BILIRUB SERPL-MCNC: 2.6 MG/DL (ref 0.2–0.7)
BILIRUB SERPL-MCNC: 3 MG/DL (ref 0.2–0.7)
BILIRUBIN URINE: ABNORMAL
BILIRUBIN URINE: NEGATIVE
BLOOD CULTURE, ROUTINE: NORMAL
BLOOD, URINE: ABNORMAL
BLOOD, URINE: NEGATIVE
BODY FLUID CULTURE, STERILE: NORMAL
BUN BLDV-MCNC: 25 MG/DL (ref 8–23)
BUN BLDV-MCNC: 30 MG/DL (ref 8–23)
BUN BLDV-MCNC: 30 MG/DL (ref 8–23)
BUN BLDV-MCNC: 32 MG/DL (ref 8–23)
BUN BLDV-MCNC: 32 MG/DL (ref 8–23)
BUN BLDV-MCNC: 33 MG/DL (ref 8–23)
BUN BLDV-MCNC: 33 MG/DL (ref 8–23)
BUN BLDV-MCNC: 35 MG/DL (ref 8–23)
BUN BLDV-MCNC: 56 MG/DL (ref 8–23)
BUN BLDV-MCNC: 66 MG/DL (ref 8–23)
CALCIUM IONIZED: 1.2 MMOL/L (ref 1.12–1.32)
CALCIUM SERPL-MCNC: 10.1 MG/DL (ref 8.5–9.9)
CALCIUM SERPL-MCNC: 10.9 MG/DL (ref 8.5–9.9)
CALCIUM SERPL-MCNC: 11 MG/DL (ref 8.5–9.9)
CALCIUM SERPL-MCNC: 12.3 MG/DL (ref 8.5–9.9)
CALCIUM SERPL-MCNC: 14 MG/DL (ref 8.5–9.9)
CALCIUM SERPL-MCNC: 14.6 MG/DL (ref 8.5–9.9)
CALCIUM SERPL-MCNC: 8.6 MG/DL (ref 8.5–9.9)
CALCIUM SERPL-MCNC: 8.8 MG/DL (ref 8.5–9.9)
CALCIUM SERPL-MCNC: 8.9 MG/DL (ref 8.5–9.9)
CALCIUM SERPL-MCNC: 9.8 MG/DL (ref 8.5–9.9)
CELL COUNT FLUID TYPE: ABNORMAL
CHLORIDE BLD-SCNC: 101 MEQ/L (ref 95–107)
CHLORIDE BLD-SCNC: 103 MEQ/L (ref 95–107)
CHLORIDE BLD-SCNC: 104 MEQ/L (ref 95–107)
CHLORIDE BLD-SCNC: 105 MEQ/L (ref 95–107)
CHLORIDE BLD-SCNC: 106 MEQ/L (ref 95–107)
CHLORIDE BLD-SCNC: 106 MEQ/L (ref 95–107)
CHLORIDE BLD-SCNC: 97 MEQ/L (ref 95–107)
CHLORIDE BLD-SCNC: 99 MEQ/L (ref 95–107)
CHOLESTEROL, TOTAL: 131 MG/DL (ref 0–199)
CLARITY: ABNORMAL
CLARITY: CLEAR
CLOT EVALUATION: ABNORMAL
CO2: 15 MEQ/L (ref 20–31)
CO2: 17 MEQ/L (ref 20–31)
CO2: 19 MEQ/L (ref 20–31)
CO2: 19 MEQ/L (ref 20–31)
CO2: 20 MEQ/L (ref 20–31)
CO2: 21 MEQ/L (ref 20–31)
CO2: 22 MEQ/L (ref 20–31)
CO2: 24 MEQ/L (ref 20–31)
COLOR FLUID: YELLOW
COLOR: ABNORMAL
COLOR: ABNORMAL
CREAT SERPL-MCNC: 1.3 MG/DL (ref 0.7–1.2)
CREAT SERPL-MCNC: 1.33 MG/DL (ref 0.7–1.2)
CREAT SERPL-MCNC: 1.39 MG/DL (ref 0.7–1.2)
CREAT SERPL-MCNC: 1.39 MG/DL (ref 0.7–1.2)
CREAT SERPL-MCNC: 1.53 MG/DL (ref 0.7–1.2)
CREAT SERPL-MCNC: 1.61 MG/DL (ref 0.7–1.2)
CREAT SERPL-MCNC: 1.78 MG/DL (ref 0.7–1.2)
CREAT SERPL-MCNC: 1.86 MG/DL (ref 0.7–1.2)
CREAT SERPL-MCNC: 1.87 MG/DL (ref 0.7–1.2)
CREAT SERPL-MCNC: 2.75 MG/DL (ref 0.7–1.2)
CULTURE, BLOOD 2: NORMAL
EKG ATRIAL RATE: 110 BPM
EKG P AXIS: 69 DEGREES
EKG P-R INTERVAL: 148 MS
EKG Q-T INTERVAL: 320 MS
EKG QRS DURATION: 72 MS
EKG QTC CALCULATION (BAZETT): 433 MS
EKG R AXIS: -6 DEGREES
EKG T AXIS: 5 DEGREES
EKG VENTRICULAR RATE: 110 BPM
EOSINOPHILS ABSOLUTE: 0 K/UL (ref 0–0.7)
EOSINOPHILS ABSOLUTE: 0.1 K/UL (ref 0–0.7)
EOSINOPHILS RELATIVE PERCENT: 0.5 %
EOSINOPHILS RELATIVE PERCENT: 0.9 %
EOSINOPHILS RELATIVE PERCENT: 1.4 %
EOSINOPHILS RELATIVE PERCENT: 1.8 %
EPITHELIAL CELLS, UA: ABNORMAL /HPF (ref 0–5)
ETHANOL PERCENT: NORMAL G/DL
ETHANOL PERCENT: NORMAL G/DL
ETHANOL: <10 MG/DL (ref 0–0.08)
ETHANOL: <10 MG/DL (ref 0–0.08)
FLUID PATH CONSULT: YES
FLUID TYPE: NORMAL
GAMMA GLOBULIN: 1.79 G/DL (ref 0.62–1.51)
GFR AFRICAN AMERICAN: 28.5
GFR AFRICAN AMERICAN: 30
GFR AFRICAN AMERICAN: 44
GFR AFRICAN AMERICAN: 44.5
GFR AFRICAN AMERICAN: 44.8
GFR AFRICAN AMERICAN: 47.1
GFR AFRICAN AMERICAN: 52.9
GFR AFRICAN AMERICAN: 56.1
GFR AFRICAN AMERICAN: >60
GFR NON-AFRICAN AMERICAN: 23.6
GFR NON-AFRICAN AMERICAN: 25
GFR NON-AFRICAN AMERICAN: 36
GFR NON-AFRICAN AMERICAN: 36.8
GFR NON-AFRICAN AMERICAN: 37
GFR NON-AFRICAN AMERICAN: 38.9
GFR NON-AFRICAN AMERICAN: 43.7
GFR NON-AFRICAN AMERICAN: 46.4
GFR NON-AFRICAN AMERICAN: 51.8
GFR NON-AFRICAN AMERICAN: 51.8
GFR NON-AFRICAN AMERICAN: 54.5
GFR NON-AFRICAN AMERICAN: 56
GLOBULIN: 3.7 G/DL (ref 2.3–3.5)
GLOBULIN: 4 G/DL (ref 2.3–3.5)
GLOBULIN: 4.3 G/DL (ref 2.3–3.5)
GLOBULIN: 4.7 G/DL (ref 2.3–3.5)
GLOBULIN: 4.9 G/DL (ref 2.3–3.5)
GLUCOSE BLD-MCNC: 103 MG/DL (ref 70–99)
GLUCOSE BLD-MCNC: 119 MG/DL (ref 60–115)
GLUCOSE BLD-MCNC: 122 MG/DL (ref 60–115)
GLUCOSE BLD-MCNC: 136 MG/DL (ref 70–99)
GLUCOSE BLD-MCNC: 145 MG/DL (ref 70–99)
GLUCOSE BLD-MCNC: 147 MG/DL (ref 70–99)
GLUCOSE BLD-MCNC: 153 MG/DL (ref 70–99)
GLUCOSE BLD-MCNC: 172 MG/DL (ref 60–115)
GLUCOSE BLD-MCNC: 60 MG/DL (ref 60–115)
GLUCOSE BLD-MCNC: 65 MG/DL (ref 60–115)
GLUCOSE BLD-MCNC: 70 MG/DL (ref 60–115)
GLUCOSE BLD-MCNC: 71 MG/DL (ref 60–115)
GLUCOSE BLD-MCNC: 73 MG/DL (ref 60–115)
GLUCOSE BLD-MCNC: 73 MG/DL (ref 60–115)
GLUCOSE BLD-MCNC: 73 MG/DL (ref 70–99)
GLUCOSE BLD-MCNC: 74 MG/DL (ref 60–115)
GLUCOSE BLD-MCNC: 75 MG/DL (ref 60–115)
GLUCOSE BLD-MCNC: 75 MG/DL (ref 70–99)
GLUCOSE BLD-MCNC: 81 MG/DL (ref 60–115)
GLUCOSE BLD-MCNC: 81 MG/DL (ref 70–99)
GLUCOSE BLD-MCNC: 82 MG/DL (ref 60–115)
GLUCOSE BLD-MCNC: 82 MG/DL (ref 70–99)
GLUCOSE BLD-MCNC: 83 MG/DL (ref 60–115)
GLUCOSE BLD-MCNC: 84 MG/DL (ref 60–115)
GLUCOSE BLD-MCNC: 84 MG/DL (ref 60–115)
GLUCOSE BLD-MCNC: 86 MG/DL (ref 60–115)
GLUCOSE BLD-MCNC: 87 MG/DL (ref 60–115)
GLUCOSE BLD-MCNC: 89 MG/DL (ref 70–99)
GLUCOSE BLD-MCNC: 97 MG/DL (ref 60–115)
GLUCOSE BLD-MCNC: 97 MG/DL (ref 60–115)
GLUCOSE BLD-MCNC: 98 MG/DL (ref 60–115)
GLUCOSE URINE: NEGATIVE MG/DL
GLUCOSE URINE: NEGATIVE MG/DL
GLUCOSE, FLUID: 79 MG/DL
GRAM STAIN RESULT: NORMAL
HAV IGM SER IA-ACNC: NORMAL
HBA1C MFR BLD: 5.3 % (ref 4.8–5.9)
HCO3 ARTERIAL: 18.1 MMOL/L (ref 21–29)
HCT VFR BLD CALC: 33.8 % (ref 42–52)
HCT VFR BLD CALC: 35.4 % (ref 42–52)
HCT VFR BLD CALC: 36.8 % (ref 42–52)
HCT VFR BLD CALC: 37.7 % (ref 42–52)
HCT VFR BLD CALC: 38.8 % (ref 42–52)
HCT VFR BLD CALC: 40 % (ref 42–52)
HCT VFR BLD CALC: 40.1 % (ref 42–52)
HCT VFR BLD CALC: 42.6 % (ref 42–52)
HCT VFR BLD CALC: 42.7 % (ref 42–52)
HDLC SERPL-MCNC: 28 MG/DL (ref 40–59)
HEMOGLOBIN: 11.6 G/DL (ref 14–18)
HEMOGLOBIN: 12.2 G/DL (ref 14–18)
HEMOGLOBIN: 12.6 G/DL (ref 14–18)
HEMOGLOBIN: 12.9 G/DL (ref 14–18)
HEMOGLOBIN: 13.2 G/DL (ref 14–18)
HEMOGLOBIN: 13.2 G/DL (ref 14–18)
HEMOGLOBIN: 13.5 GM/DL (ref 13.5–17.5)
HEMOGLOBIN: 13.6 G/DL (ref 14–18)
HEMOGLOBIN: 14.1 G/DL (ref 14–18)
HEMOGLOBIN: 14.4 G/DL (ref 14–18)
HEPATITIS B CORE IGM ANTIBODY: NORMAL
HEPATITIS B SURFACE ANTIGEN INTERPRETATION: NORMAL
HEPATITIS C ANTIBODY INTERPRETATION: NORMAL
HEPATITIS INTERPRETATION:: NORMAL
HYALINE CASTS: ABNORMAL /HPF (ref 0–5)
INR BLD: 1.2
INTERPRETATION: NORMAL
KETONES, URINE: ABNORMAL MG/DL
KETONES, URINE: NEGATIVE MG/DL
LACTATE: 3.31 MMOL/L (ref 0.4–2)
LACTIC ACID: 2.4 MMOL/L (ref 0.5–2.2)
LD, FLUID: 36 U/L
LDL CHOLESTEROL CALCULATED: 82 MG/DL (ref 0–129)
LEUKOCYTE ESTERASE, URINE: ABNORMAL
LEUKOCYTE ESTERASE, URINE: NEGATIVE
LIPASE: 28 U/L (ref 12–95)
LYMPHOCYTES ABSOLUTE: 0.4 K/UL (ref 1–4.8)
LYMPHOCYTES ABSOLUTE: 0.5 K/UL (ref 1–4.8)
LYMPHOCYTES ABSOLUTE: 0.5 K/UL (ref 1–4.8)
LYMPHOCYTES ABSOLUTE: 0.6 K/UL (ref 1–4.8)
LYMPHOCYTES RELATIVE PERCENT: 11.6 %
LYMPHOCYTES RELATIVE PERCENT: 6.1 %
LYMPHOCYTES RELATIVE PERCENT: 6.4 %
LYMPHOCYTES RELATIVE PERCENT: 8.6 %
LYMPHOCYTES, BODY FLUID: 76 %
MAGNESIUM: 1.3 MG/DL (ref 1.7–2.4)
MAGNESIUM: 1.5 MG/DL (ref 1.7–2.4)
MAGNESIUM: 1.6 MG/DL (ref 1.7–2.4)
MAGNESIUM: 1.7 MG/DL (ref 1.7–2.4)
MCH RBC QN AUTO: 28.8 PG (ref 27–31.3)
MCH RBC QN AUTO: 28.8 PG (ref 27–31.3)
MCH RBC QN AUTO: 28.9 PG (ref 27–31.3)
MCH RBC QN AUTO: 29 PG (ref 27–31.3)
MCH RBC QN AUTO: 29.1 PG (ref 27–31.3)
MCH RBC QN AUTO: 29.2 PG (ref 27–31.3)
MCH RBC QN AUTO: 29.2 PG (ref 27–31.3)
MCH RBC QN AUTO: 29.3 PG (ref 27–31.3)
MCH RBC QN AUTO: 29.4 PG (ref 27–31.3)
MCHC RBC AUTO-ENTMCNC: 33.1 % (ref 33–37)
MCHC RBC AUTO-ENTMCNC: 33.1 % (ref 33–37)
MCHC RBC AUTO-ENTMCNC: 33.7 % (ref 33–37)
MCHC RBC AUTO-ENTMCNC: 33.8 % (ref 33–37)
MCHC RBC AUTO-ENTMCNC: 34 % (ref 33–37)
MCHC RBC AUTO-ENTMCNC: 34.1 % (ref 33–37)
MCHC RBC AUTO-ENTMCNC: 34.2 % (ref 33–37)
MCHC RBC AUTO-ENTMCNC: 34.3 % (ref 33–37)
MCHC RBC AUTO-ENTMCNC: 34.4 % (ref 33–37)
MCV RBC AUTO: 84.5 FL (ref 80–100)
MCV RBC AUTO: 85 FL (ref 80–100)
MCV RBC AUTO: 85.2 FL (ref 80–100)
MCV RBC AUTO: 85.3 FL (ref 80–100)
MCV RBC AUTO: 85.5 FL (ref 80–100)
MCV RBC AUTO: 85.5 FL (ref 80–100)
MCV RBC AUTO: 86.7 FL (ref 80–100)
MCV RBC AUTO: 87.2 FL (ref 80–100)
MCV RBC AUTO: 87.6 FL (ref 80–100)
MESOTHELIAL FLUID: PRESENT %
MONOCYTE, FLUID: 16 %
MONOCYTES ABSOLUTE: 0.4 K/UL (ref 0.2–0.8)
MONOCYTES ABSOLUTE: 0.7 K/UL (ref 0.2–0.8)
MONOCYTES RELATIVE PERCENT: 11.1 %
MONOCYTES RELATIVE PERCENT: 14.4 %
MONOCYTES RELATIVE PERCENT: 6.2 %
MONOCYTES RELATIVE PERCENT: 9.2 %
NEUTROPHIL, FLUID: 8 %
NEUTROPHILS ABSOLUTE: 3.5 K/UL (ref 1.4–6.5)
NEUTROPHILS ABSOLUTE: 4.8 K/UL (ref 1.4–6.5)
NEUTROPHILS ABSOLUTE: 5.6 K/UL (ref 1.4–6.5)
NEUTROPHILS ABSOLUTE: 6.3 K/UL (ref 1.4–6.5)
NEUTROPHILS RELATIVE PERCENT: 72.4 %
NEUTROPHILS RELATIVE PERCENT: 79.1 %
NEUTROPHILS RELATIVE PERCENT: 82.1 %
NEUTROPHILS RELATIVE PERCENT: 86.9 %
NITRITE, URINE: NEGATIVE
NITRITE, URINE: POSITIVE
NUCLEATED CELLS FLUID: 322 /CUMM
NUMBER OF CELLS COUNTED FLUID: 100
NUMBER OF MARKERS: 22 MARKERS
O2 SAT, ARTERIAL: 97 % (ref 93–100)
PARATHYROID HORMONE INTACT: 6.1 PG/ML (ref 15–65)
PATH CONSULT FLUID: NORMAL
PCO2 ARTERIAL: 25 MM HG (ref 35–45)
PDW BLD-RTO: 16.5 % (ref 11.5–14.5)
PDW BLD-RTO: 16.7 % (ref 11.5–14.5)
PDW BLD-RTO: 16.8 % (ref 11.5–14.5)
PDW BLD-RTO: 17.1 % (ref 11.5–14.5)
PDW BLD-RTO: 17.4 % (ref 11.5–14.5)
PERFORMED ON: ABNORMAL
PERFORMED ON: NORMAL
PH ARTERIAL: 7.46 (ref 7.35–7.45)
PH UA: 5 (ref 5–9)
PH UA: 5 (ref 5–9)
PHOSPHORUS: 2.1 MG/DL (ref 2.3–4.8)
PHOSPHORUS: 2.3 MG/DL (ref 2.3–4.8)
PHOSPHORUS: 3.2 MG/DL (ref 2.3–4.8)
PLATELET # BLD: 103 K/UL (ref 130–400)
PLATELET # BLD: 109 K/UL (ref 130–400)
PLATELET # BLD: 116 K/UL (ref 130–400)
PLATELET # BLD: 116 K/UL (ref 130–400)
PLATELET # BLD: 125 K/UL (ref 130–400)
PLATELET # BLD: 81 K/UL (ref 130–400)
PLATELET # BLD: 86 K/UL (ref 130–400)
PLATELET # BLD: 94 K/UL (ref 130–400)
PLATELET # BLD: 95 K/UL (ref 130–400)
PLATELET SLIDE REVIEW: ABNORMAL
PLATELET SLIDE REVIEW: NORMAL
PO2 ARTERIAL: 87 MM HG (ref 75–108)
POC CHLORIDE: 104 MEQ/L (ref 99–110)
POC CREATININE WHOLE BLOOD: 1.9
POC CREATININE: 1.9 MG/DL (ref 0.8–1.3)
POC CREATININE: 2.6 MG/DL (ref 0.8–1.3)
POC FIO2: 21
POC HEMATOCRIT: 40 % (ref 41–53)
POC POTASSIUM: 3.8 MEQ/L (ref 3.5–5.1)
POC SAMPLE TYPE: ABNORMAL
POC SAMPLE TYPE: ABNORMAL
POC SODIUM: 136 MEQ/L (ref 136–145)
POTASSIUM REFLEX MAGNESIUM: 3.1 MEQ/L (ref 3.4–4.9)
POTASSIUM REFLEX MAGNESIUM: 3.3 MEQ/L (ref 3.4–4.9)
POTASSIUM REFLEX MAGNESIUM: 3.4 MEQ/L (ref 3.4–4.9)
POTASSIUM REFLEX MAGNESIUM: 3.7 MEQ/L (ref 3.4–4.9)
POTASSIUM REFLEX MAGNESIUM: 3.9 MEQ/L (ref 3.4–4.9)
POTASSIUM REFLEX MAGNESIUM: 4.4 MEQ/L (ref 3.4–4.9)
POTASSIUM SERPL-SCNC: 3.4 MEQ/L (ref 3.4–4.9)
POTASSIUM SERPL-SCNC: 3.5 MEQ/L (ref 3.4–4.9)
POTASSIUM SERPL-SCNC: 3.7 MEQ/L (ref 3.4–4.9)
POTASSIUM SERPL-SCNC: 4.5 MEQ/L (ref 3.4–4.9)
PROF LEUK/LYMPH PHENO 16 OR MORE MARKERS: NORMAL
PROTEIN ELECTROPHORESIS, SERUM: ABNORMAL
PROTEIN FLUID: 1.8 G/DL
PROTEIN UA: 30 MG/DL
PROTEIN UA: NEGATIVE MG/DL
PROTHROMBIN TIME: 14.9 SEC (ref 12.3–14.9)
RBC # BLD: 3.98 M/UL (ref 4.7–6.1)
RBC # BLD: 4.14 M/UL (ref 4.7–6.1)
RBC # BLD: 4.31 M/UL (ref 4.7–6.1)
RBC # BLD: 4.42 M/UL (ref 4.7–6.1)
RBC # BLD: 4.56 M/UL (ref 4.7–6.1)
RBC # BLD: 4.6 M/UL (ref 4.7–6.1)
RBC # BLD: 4.62 M/UL (ref 4.7–6.1)
RBC # BLD: 4.9 M/UL (ref 4.7–6.1)
RBC # BLD: 4.99 M/UL (ref 4.7–6.1)
RBC FLUID: 4769 /CUMM
RBC UA: ABNORMAL /HPF (ref 0–5)
SARS-COV-2, NAAT: NOT DETECTED
SARS-COV-2, NAAT: NOT DETECTED
SLIDE REVIEW: ABNORMAL
SODIUM BLD-SCNC: 133 MEQ/L (ref 135–144)
SODIUM BLD-SCNC: 134 MEQ/L (ref 135–144)
SODIUM BLD-SCNC: 134 MEQ/L (ref 135–144)
SODIUM BLD-SCNC: 135 MEQ/L (ref 135–144)
SODIUM BLD-SCNC: 136 MEQ/L (ref 135–144)
SODIUM BLD-SCNC: 137 MEQ/L (ref 135–144)
SODIUM BLD-SCNC: 137 MEQ/L (ref 135–144)
SOURCE: NORMAL
SPE/IFE INTERPRETATION: ABNORMAL
SPECIFIC GRAVITY UA: 1.02 (ref 1–1.03)
SPECIFIC GRAVITY UA: 1.02 (ref 1–1.03)
TCO2 ARTERIAL: 19 (ref 22–29)
TECHNICAL LEUK/LYMPH PHENO, 22 MARKERS: NORMAL
TOTAL PROTEIN: 6.5 G/DL (ref 6.3–8.2)
TOTAL PROTEIN: 6.9 G/DL (ref 6.3–8)
TOTAL PROTEIN: 6.9 G/DL (ref 6.3–8)
TOTAL PROTEIN: 7 G/DL (ref 6.3–8)
TOTAL PROTEIN: 7.7 G/DL (ref 6.3–8)
TOTAL PROTEIN: 7.8 G/DL (ref 6.3–8)
TRIGL SERPL-MCNC: 103 MG/DL (ref 0–150)
TROPONIN: 0.02 NG/ML (ref 0–0.01)
TSH SERPL DL<=0.05 MIU/L-ACNC: 1.75 UIU/ML (ref 0.44–3.86)
URINE REFLEX TO CULTURE: ABNORMAL
URINE REFLEX TO CULTURE: ABNORMAL
UROBILINOGEN, URINE: 1 E.U./DL
UROBILINOGEN, URINE: 1 E.U./DL
VITAMIN D 1,25-DIHYDROXY: 13.9 PG/ML (ref 19.9–79.3)
VITAMIN D 25-HYDROXY: 11.5 NG/ML (ref 30–100)
WBC # BLD: 4.1 K/UL (ref 4.8–10.8)
WBC # BLD: 4.9 K/UL (ref 4.8–10.8)
WBC # BLD: 5 K/UL (ref 4.8–10.8)
WBC # BLD: 5.4 K/UL (ref 4.8–10.8)
WBC # BLD: 5.4 K/UL (ref 4.8–10.8)
WBC # BLD: 5.6 K/UL (ref 4.8–10.8)
WBC # BLD: 6.1 K/UL (ref 4.8–10.8)
WBC # BLD: 6.5 K/UL (ref 4.8–10.8)
WBC # BLD: 7.7 K/UL (ref 4.8–10.8)
WBC UA: ABNORMAL /HPF (ref 0–5)

## 2021-01-01 PROCEDURE — 85014 HEMATOCRIT: CPT

## 2021-01-01 PROCEDURE — 82803 BLOOD GASES ANY COMBINATION: CPT

## 2021-01-01 PROCEDURE — 36415 COLL VENOUS BLD VENIPUNCTURE: CPT

## 2021-01-01 PROCEDURE — 49083 ABD PARACENTESIS W/IMAGING: CPT | Performed by: RADIOLOGY

## 2021-01-01 PROCEDURE — 83690 ASSAY OF LIPASE: CPT

## 2021-01-01 PROCEDURE — 82150 ASSAY OF AMYLASE: CPT

## 2021-01-01 PROCEDURE — 84165 PROTEIN E-PHORESIS SERUM: CPT

## 2021-01-01 PROCEDURE — 6370000000 HC RX 637 (ALT 250 FOR IP): Performed by: RADIOLOGY

## 2021-01-01 PROCEDURE — 2580000003 HC RX 258: Performed by: EMERGENCY MEDICINE

## 2021-01-01 PROCEDURE — G8417 CALC BMI ABV UP PARAM F/U: HCPCS | Performed by: NURSE PRACTITIONER

## 2021-01-01 PROCEDURE — 4004F PT TOBACCO SCREEN RCVD TLK: CPT | Performed by: NURSE PRACTITIONER

## 2021-01-01 PROCEDURE — 84132 ASSAY OF SERUM POTASSIUM: CPT

## 2021-01-01 PROCEDURE — 82306 VITAMIN D 25 HYDROXY: CPT

## 2021-01-01 PROCEDURE — 82542 COL CHROMOTOGRAPHY QUAL/QUAN: CPT

## 2021-01-01 PROCEDURE — 2580000003 HC RX 258: Performed by: RADIOLOGY

## 2021-01-01 PROCEDURE — P9047 ALBUMIN (HUMAN), 25%, 50ML: HCPCS | Performed by: RADIOLOGY

## 2021-01-01 PROCEDURE — 80053 COMPREHEN METABOLIC PANEL: CPT

## 2021-01-01 PROCEDURE — 88305 TISSUE EXAM BY PATHOLOGIST: CPT

## 2021-01-01 PROCEDURE — 6370000000 HC RX 637 (ALT 250 FOR IP): Performed by: INTERNAL MEDICINE

## 2021-01-01 PROCEDURE — 97535 SELF CARE MNGMENT TRAINING: CPT

## 2021-01-01 PROCEDURE — 0W9G3ZX DRAINAGE OF PERITONEAL CAVITY, PERCUTANEOUS APPROACH, DIAGNOSTIC: ICD-10-PCS | Performed by: RADIOLOGY

## 2021-01-01 PROCEDURE — 99285 EMERGENCY DEPT VISIT HI MDM: CPT

## 2021-01-01 PROCEDURE — 84155 ASSAY OF PROTEIN SERUM: CPT

## 2021-01-01 PROCEDURE — 2580000003 HC RX 258: Performed by: INTERNAL MEDICINE

## 2021-01-01 PROCEDURE — 83735 ASSAY OF MAGNESIUM: CPT

## 2021-01-01 PROCEDURE — 83605 ASSAY OF LACTIC ACID: CPT

## 2021-01-01 PROCEDURE — 80048 BASIC METABOLIC PNL TOTAL CA: CPT

## 2021-01-01 PROCEDURE — 97530 THERAPEUTIC ACTIVITIES: CPT

## 2021-01-01 PROCEDURE — 1111F DSCHRG MED/CURRENT MED MERGE: CPT | Performed by: NURSE PRACTITIONER

## 2021-01-01 PROCEDURE — 99222 1ST HOSP IP/OBS MODERATE 55: CPT | Performed by: INTERNAL MEDICINE

## 2021-01-01 PROCEDURE — 99284 EMERGENCY DEPT VISIT MOD MDM: CPT

## 2021-01-01 PROCEDURE — 1210000000 HC MED SURG R&B

## 2021-01-01 PROCEDURE — 84295 ASSAY OF SERUM SODIUM: CPT

## 2021-01-01 PROCEDURE — 82565 ASSAY OF CREATININE: CPT

## 2021-01-01 PROCEDURE — 85027 COMPLETE CBC AUTOMATED: CPT

## 2021-01-01 PROCEDURE — 96365 THER/PROPH/DIAG IV INF INIT: CPT

## 2021-01-01 PROCEDURE — 6360000002 HC RX W HCPCS: Performed by: INTERNAL MEDICINE

## 2021-01-01 PROCEDURE — 85025 COMPLETE CBC W/AUTO DIFF WBC: CPT

## 2021-01-01 PROCEDURE — 94664 DEMO&/EVAL PT USE INHALER: CPT

## 2021-01-01 PROCEDURE — 82077 ASSAY SPEC XCP UR&BREATH IA: CPT

## 2021-01-01 PROCEDURE — 94760 N-INVAS EAR/PLS OXIMETRY 1: CPT

## 2021-01-01 PROCEDURE — 82105 ALPHA-FETOPROTEIN SERUM: CPT

## 2021-01-01 PROCEDURE — 89051 BODY FLUID CELL COUNT: CPT

## 2021-01-01 PROCEDURE — 6360000002 HC RX W HCPCS: Performed by: FAMILY MEDICINE

## 2021-01-01 PROCEDURE — 82140 ASSAY OF AMMONIA: CPT

## 2021-01-01 PROCEDURE — 84157 ASSAY OF PROTEIN OTHER: CPT

## 2021-01-01 PROCEDURE — 84484 ASSAY OF TROPONIN QUANT: CPT

## 2021-01-01 PROCEDURE — 6360000002 HC RX W HCPCS: Performed by: RADIOLOGY

## 2021-01-01 PROCEDURE — 82330 ASSAY OF CALCIUM: CPT

## 2021-01-01 PROCEDURE — 93005 ELECTROCARDIOGRAM TRACING: CPT | Performed by: EMERGENCY MEDICINE

## 2021-01-01 PROCEDURE — 77012 CT SCAN FOR NEEDLE BIOPSY: CPT

## 2021-01-01 PROCEDURE — 80074 ACUTE HEPATITIS PANEL: CPT

## 2021-01-01 PROCEDURE — 82435 ASSAY OF BLOOD CHLORIDE: CPT

## 2021-01-01 PROCEDURE — 2500000003 HC RX 250 WO HCPCS: Performed by: RADIOLOGY

## 2021-01-01 PROCEDURE — 88341 IMHCHEM/IMCYTCHM EA ADD ANTB: CPT

## 2021-01-01 PROCEDURE — 84100 ASSAY OF PHOSPHORUS: CPT

## 2021-01-01 PROCEDURE — 2709999900 IR US GUIDED PARACENTESIS

## 2021-01-01 PROCEDURE — 82652 VIT D 1 25-DIHYDROXY: CPT

## 2021-01-01 PROCEDURE — 49180 BIOPSY ABDOMINAL MASS: CPT | Performed by: RADIOLOGY

## 2021-01-01 PROCEDURE — 71045 X-RAY EXAM CHEST 1 VIEW: CPT

## 2021-01-01 PROCEDURE — 3017F COLORECTAL CA SCREEN DOC REV: CPT | Performed by: NURSE PRACTITIONER

## 2021-01-01 PROCEDURE — 87070 CULTURE OTHR SPECIMN AEROBIC: CPT

## 2021-01-01 PROCEDURE — 83615 LACTATE (LD) (LDH) ENZYME: CPT

## 2021-01-01 PROCEDURE — 88342 IMHCHEM/IMCYTCHM 1ST ANTB: CPT

## 2021-01-01 PROCEDURE — 97116 GAIT TRAINING THERAPY: CPT

## 2021-01-01 PROCEDURE — 36600 WITHDRAWAL OF ARTERIAL BLOOD: CPT

## 2021-01-01 PROCEDURE — 88112 CYTOPATH CELL ENHANCE TECH: CPT

## 2021-01-01 PROCEDURE — 93010 ELECTROCARDIOGRAM REPORT: CPT | Performed by: INTERNAL MEDICINE

## 2021-01-01 PROCEDURE — 49180 BIOPSY ABDOMINAL MASS: CPT

## 2021-01-01 PROCEDURE — 99232 SBSQ HOSP IP/OBS MODERATE 35: CPT | Performed by: RADIOLOGY

## 2021-01-01 PROCEDURE — 77012 CT SCAN FOR NEEDLE BIOPSY: CPT | Performed by: RADIOLOGY

## 2021-01-01 PROCEDURE — 82945 GLUCOSE OTHER FLUID: CPT

## 2021-01-01 PROCEDURE — 74176 CT ABD & PELVIS W/O CONTRAST: CPT

## 2021-01-01 PROCEDURE — 87040 BLOOD CULTURE FOR BACTERIA: CPT

## 2021-01-01 PROCEDURE — 97162 PT EVAL MOD COMPLEX 30 MIN: CPT

## 2021-01-01 PROCEDURE — 70450 CT HEAD/BRAIN W/O DYE: CPT

## 2021-01-01 PROCEDURE — 81001 URINALYSIS AUTO W/SCOPE: CPT

## 2021-01-01 PROCEDURE — 87205 SMEAR GRAM STAIN: CPT

## 2021-01-01 PROCEDURE — 97166 OT EVAL MOD COMPLEX 45 MIN: CPT

## 2021-01-01 PROCEDURE — 82042 OTHER SOURCE ALBUMIN QUAN EA: CPT

## 2021-01-01 PROCEDURE — 99204 OFFICE O/P NEW MOD 45 MIN: CPT | Performed by: NURSE PRACTITIONER

## 2021-01-01 PROCEDURE — 83970 ASSAY OF PARATHORMONE: CPT

## 2021-01-01 PROCEDURE — 93005 ELECTROCARDIOGRAM TRACING: CPT | Performed by: INTERNAL MEDICINE

## 2021-01-01 PROCEDURE — 81003 URINALYSIS AUTO W/O SCOPE: CPT

## 2021-01-01 PROCEDURE — 76705 ECHO EXAM OF ABDOMEN: CPT

## 2021-01-01 PROCEDURE — G8428 CUR MEDS NOT DOCUMENT: HCPCS | Performed by: NURSE PRACTITIONER

## 2021-01-01 PROCEDURE — 99223 1ST HOSP IP/OBS HIGH 75: CPT | Performed by: RADIOLOGY

## 2021-01-01 PROCEDURE — 87635 SARS-COV-2 COVID-19 AMP PRB: CPT

## 2021-01-01 PROCEDURE — 87086 URINE CULTURE/COLONY COUNT: CPT

## 2021-01-01 PROCEDURE — 85610 PROTHROMBIN TIME: CPT

## 2021-01-01 RX ORDER — HYDRALAZINE HYDROCHLORIDE 20 MG/ML
10 INJECTION INTRAMUSCULAR; INTRAVENOUS EVERY 6 HOURS PRN
Status: DISCONTINUED | OUTPATIENT
Start: 2021-01-01 | End: 2021-01-01 | Stop reason: HOSPADM

## 2021-01-01 RX ORDER — SODIUM CHLORIDE 0.9 % (FLUSH) 0.9 %
5-40 SYRINGE (ML) INJECTION EVERY 12 HOURS SCHEDULED
Status: DISCONTINUED | OUTPATIENT
Start: 2021-01-01 | End: 2021-05-03 | Stop reason: HOSPADM

## 2021-01-01 RX ORDER — MORPHINE SULFATE 2 MG/ML
1 INJECTION, SOLUTION INTRAMUSCULAR; INTRAVENOUS EVERY 4 HOURS PRN
Status: DISCONTINUED | OUTPATIENT
Start: 2021-01-01 | End: 2021-05-03 | Stop reason: HOSPADM

## 2021-01-01 RX ORDER — CALCITONIN SALMON 200 [USP'U]/ML
100 INJECTION, SOLUTION INTRAMUSCULAR; SUBCUTANEOUS DAILY
Status: COMPLETED | OUTPATIENT
Start: 2021-01-01 | End: 2021-01-01

## 2021-01-01 RX ORDER — ACETAMINOPHEN 325 MG/1
650 TABLET ORAL EVERY 6 HOURS PRN
Status: DISCONTINUED | OUTPATIENT
Start: 2021-01-01 | End: 2021-05-03 | Stop reason: HOSPADM

## 2021-01-01 RX ORDER — IPRATROPIUM BROMIDE AND ALBUTEROL SULFATE 2.5; .5 MG/3ML; MG/3ML
1 SOLUTION RESPIRATORY (INHALATION) 4 TIMES DAILY PRN
Status: DISCONTINUED | OUTPATIENT
Start: 2021-01-01 | End: 2021-01-01

## 2021-01-01 RX ORDER — LIDOCAINE HYDROCHLORIDE 20 MG/ML
INJECTION, SOLUTION INFILTRATION; PERINEURAL
Status: COMPLETED | OUTPATIENT
Start: 2021-01-01 | End: 2021-01-01

## 2021-01-01 RX ORDER — FUROSEMIDE 10 MG/ML
20 INJECTION INTRAMUSCULAR; INTRAVENOUS 2 TIMES DAILY
Status: DISCONTINUED | OUTPATIENT
Start: 2021-01-01 | End: 2021-01-01

## 2021-01-01 RX ORDER — SODIUM CHLORIDE 0.9 % (FLUSH) 0.9 %
5-40 SYRINGE (ML) INJECTION EVERY 12 HOURS SCHEDULED
Status: DISCONTINUED | OUTPATIENT
Start: 2021-01-01 | End: 2021-01-01 | Stop reason: HOSPADM

## 2021-01-01 RX ORDER — FUROSEMIDE 10 MG/ML
20 INJECTION INTRAMUSCULAR; INTRAVENOUS DAILY
Status: DISCONTINUED | OUTPATIENT
Start: 2021-01-01 | End: 2021-01-01

## 2021-01-01 RX ORDER — FLUTICASONE PROPIONATE 50 MCG
1 SPRAY, SUSPENSION (ML) NASAL 2 TIMES DAILY
COMMUNITY

## 2021-01-01 RX ORDER — POTASSIUM CHLORIDE 20 MEQ/1
40 TABLET, EXTENDED RELEASE ORAL PRN
Status: DISCONTINUED | OUTPATIENT
Start: 2021-01-01 | End: 2021-01-01 | Stop reason: HOSPADM

## 2021-01-01 RX ORDER — PANTOPRAZOLE SODIUM 40 MG/1
40 TABLET, DELAYED RELEASE ORAL
Status: DISCONTINUED | OUTPATIENT
Start: 2021-01-01 | End: 2021-01-01 | Stop reason: HOSPADM

## 2021-01-01 RX ORDER — VENLAFAXINE HYDROCHLORIDE 37.5 MG/1
37.5 CAPSULE, EXTENDED RELEASE ORAL
Status: DISCONTINUED | OUTPATIENT
Start: 2021-01-01 | End: 2021-01-01 | Stop reason: HOSPADM

## 2021-01-01 RX ORDER — ACETAMINOPHEN 325 MG/1
650 TABLET ORAL EVERY 6 HOURS PRN
Status: DISCONTINUED | OUTPATIENT
Start: 2021-01-01 | End: 2021-01-01 | Stop reason: HOSPADM

## 2021-01-01 RX ORDER — CIPROFLOXACIN 2 MG/ML
400 INJECTION, SOLUTION INTRAVENOUS ONCE
Status: COMPLETED | OUTPATIENT
Start: 2021-01-01 | End: 2021-01-01

## 2021-01-01 RX ORDER — ALBUMIN (HUMAN) 12.5 G/50ML
50 SOLUTION INTRAVENOUS ONCE
Status: COMPLETED | OUTPATIENT
Start: 2021-01-01 | End: 2021-01-01

## 2021-01-01 RX ORDER — DEXTROSE AND SODIUM CHLORIDE 5; .9 G/100ML; G/100ML
INJECTION, SOLUTION INTRAVENOUS CONTINUOUS
Status: DISCONTINUED | OUTPATIENT
Start: 2021-01-01 | End: 2021-01-01

## 2021-01-01 RX ORDER — SPIRONOLACTONE 25 MG/1
50 TABLET ORAL DAILY
Status: DISCONTINUED | OUTPATIENT
Start: 2021-01-01 | End: 2021-01-01 | Stop reason: HOSPADM

## 2021-01-01 RX ORDER — PROMETHAZINE HYDROCHLORIDE 12.5 MG/1
12.5 TABLET ORAL EVERY 6 HOURS PRN
Status: DISCONTINUED | OUTPATIENT
Start: 2021-01-01 | End: 2021-01-01 | Stop reason: HOSPADM

## 2021-01-01 RX ORDER — POTASSIUM CHLORIDE 20 MEQ/1
40 TABLET, EXTENDED RELEASE ORAL 2 TIMES DAILY WITH MEALS
Status: DISCONTINUED | OUTPATIENT
Start: 2021-01-01 | End: 2021-01-01

## 2021-01-01 RX ORDER — ACETAMINOPHEN 650 MG/1
650 SUPPOSITORY RECTAL EVERY 6 HOURS PRN
Status: DISCONTINUED | OUTPATIENT
Start: 2021-01-01 | End: 2021-05-03 | Stop reason: HOSPADM

## 2021-01-01 RX ORDER — DEXTROSE MONOHYDRATE 25 G/50ML
12.5 INJECTION, SOLUTION INTRAVENOUS PRN
Status: DISCONTINUED | OUTPATIENT
Start: 2021-01-01 | End: 2021-01-01 | Stop reason: HOSPADM

## 2021-01-01 RX ORDER — MIDAZOLAM HYDROCHLORIDE 1 MG/ML
INJECTION INTRAMUSCULAR; INTRAVENOUS
Status: COMPLETED | OUTPATIENT
Start: 2021-01-01 | End: 2021-01-01

## 2021-01-01 RX ORDER — SODIUM CHLORIDE 9 MG/ML
25 INJECTION, SOLUTION INTRAVENOUS PRN
Status: DISCONTINUED | OUTPATIENT
Start: 2021-01-01 | End: 2021-05-03 | Stop reason: HOSPADM

## 2021-01-01 RX ORDER — POLYETHYLENE GLYCOL 3350 17 G/17G
17 POWDER, FOR SOLUTION ORAL DAILY PRN
Status: DISCONTINUED | OUTPATIENT
Start: 2021-01-01 | End: 2021-01-01 | Stop reason: HOSPADM

## 2021-01-01 RX ORDER — CLOPIDOGREL BISULFATE 75 MG/1
75 TABLET ORAL DAILY
Status: DISCONTINUED | OUTPATIENT
Start: 2021-01-01 | End: 2021-01-01 | Stop reason: HOSPADM

## 2021-01-01 RX ORDER — BACITRACIN, NEOMYCIN, POLYMYXIN B 400; 3.5; 5 [USP'U]/G; MG/G; [USP'U]/G
OINTMENT TOPICAL
Status: COMPLETED | OUTPATIENT
Start: 2021-01-01 | End: 2021-01-01

## 2021-01-01 RX ORDER — ATORVASTATIN CALCIUM 40 MG/1
40 TABLET, FILM COATED ORAL NIGHTLY
Status: DISCONTINUED | OUTPATIENT
Start: 2021-01-01 | End: 2021-01-01 | Stop reason: HOSPADM

## 2021-01-01 RX ORDER — FUROSEMIDE 20 MG/1
20 TABLET ORAL DAILY
Qty: 60 TABLET | Refills: 3 | DISCHARGE
Start: 2021-01-01

## 2021-01-01 RX ORDER — POTASSIUM CHLORIDE 20 MEQ/1
20 TABLET, EXTENDED RELEASE ORAL
Status: DISCONTINUED | OUTPATIENT
Start: 2021-01-01 | End: 2021-01-01 | Stop reason: HOSPADM

## 2021-01-01 RX ORDER — FUROSEMIDE 20 MG/1
20 TABLET ORAL DAILY
Status: DISCONTINUED | OUTPATIENT
Start: 2021-01-01 | End: 2021-01-01 | Stop reason: HOSPADM

## 2021-01-01 RX ORDER — MAGNESIUM SULFATE IN WATER 40 MG/ML
2000 INJECTION, SOLUTION INTRAVENOUS ONCE
Status: COMPLETED | OUTPATIENT
Start: 2021-01-01 | End: 2021-01-01

## 2021-01-01 RX ORDER — ALUMINA, MAGNESIA, AND SIMETHICONE 2400; 2400; 240 MG/30ML; MG/30ML; MG/30ML
30 SUSPENSION ORAL DAILY PRN
COMMUNITY

## 2021-01-01 RX ORDER — IPRATROPIUM BROMIDE AND ALBUTEROL SULFATE 2.5; .5 MG/3ML; MG/3ML
1 SOLUTION RESPIRATORY (INHALATION) EVERY 4 HOURS PRN
Status: DISCONTINUED | OUTPATIENT
Start: 2021-01-01 | End: 2021-01-01 | Stop reason: HOSPADM

## 2021-01-01 RX ORDER — SODIUM CHLORIDE 9 MG/ML
25 INJECTION, SOLUTION INTRAVENOUS PRN
Status: DISCONTINUED | OUTPATIENT
Start: 2021-01-01 | End: 2021-01-01 | Stop reason: HOSPADM

## 2021-01-01 RX ORDER — MAGNESIUM SULFATE HEPTAHYDRATE 500 MG/ML
2000 INJECTION, SOLUTION INTRAMUSCULAR; INTRAVENOUS ONCE
Status: DISCONTINUED | OUTPATIENT
Start: 2021-01-01 | End: 2021-01-01 | Stop reason: RX

## 2021-01-01 RX ORDER — SPIRONOLACTONE 50 MG/1
50 TABLET, FILM COATED ORAL DAILY
Qty: 30 TABLET | Refills: 3 | DISCHARGE
Start: 2021-01-01

## 2021-01-01 RX ORDER — LACTULOSE 10 G/15ML
30 SOLUTION ORAL 3 TIMES DAILY PRN
COMMUNITY

## 2021-01-01 RX ORDER — SODIUM CHLORIDE 0.9 % (FLUSH) 0.9 %
5-40 SYRINGE (ML) INJECTION PRN
Status: DISCONTINUED | OUTPATIENT
Start: 2021-01-01 | End: 2021-05-03 | Stop reason: HOSPADM

## 2021-01-01 RX ORDER — NICOTINE POLACRILEX 4 MG
15 LOZENGE BUCCAL PRN
Status: DISCONTINUED | OUTPATIENT
Start: 2021-01-01 | End: 2021-01-01 | Stop reason: HOSPADM

## 2021-01-01 RX ORDER — MAGNESIUM SULFATE IN WATER 40 MG/ML
2000 INJECTION, SOLUTION INTRAVENOUS PRN
Status: DISCONTINUED | OUTPATIENT
Start: 2021-01-01 | End: 2021-01-01 | Stop reason: HOSPADM

## 2021-01-01 RX ORDER — POTASSIUM CHLORIDE 7.45 MG/ML
10 INJECTION INTRAVENOUS PRN
Status: DISCONTINUED | OUTPATIENT
Start: 2021-01-01 | End: 2021-01-01 | Stop reason: HOSPADM

## 2021-01-01 RX ORDER — 0.9 % SODIUM CHLORIDE 0.9 %
INTRAVENOUS SOLUTION INTRAVENOUS CONTINUOUS PRN
Status: COMPLETED | OUTPATIENT
Start: 2021-01-01 | End: 2021-01-01

## 2021-01-01 RX ORDER — LACTULOSE 10 G/15ML
20 SOLUTION ORAL 3 TIMES DAILY
Status: DISCONTINUED | OUTPATIENT
Start: 2021-01-01 | End: 2021-01-01

## 2021-01-01 RX ORDER — SODIUM CHLORIDE 9 MG/ML
INJECTION, SOLUTION INTRAVENOUS CONTINUOUS
Status: DISCONTINUED | OUTPATIENT
Start: 2021-01-01 | End: 2021-01-01

## 2021-01-01 RX ORDER — FENTANYL CITRATE 50 UG/ML
INJECTION, SOLUTION INTRAMUSCULAR; INTRAVENOUS
Status: COMPLETED | OUTPATIENT
Start: 2021-01-01 | End: 2021-01-01

## 2021-01-01 RX ORDER — ALBUTEROL SULFATE 90 UG/1
2 AEROSOL, METERED RESPIRATORY (INHALATION) EVERY 6 HOURS PRN
COMMUNITY

## 2021-01-01 RX ORDER — PANTOPRAZOLE SODIUM 40 MG/1
40 TABLET, DELAYED RELEASE ORAL
Qty: 30 TABLET | Refills: 3 | DISCHARGE
Start: 2021-01-01

## 2021-01-01 RX ORDER — OMEPRAZOLE 20 MG/1
40 CAPSULE, DELAYED RELEASE ORAL DAILY
COMMUNITY

## 2021-01-01 RX ORDER — ASPIRIN 81 MG/1
81 TABLET ORAL DAILY
Status: DISCONTINUED | OUTPATIENT
Start: 2021-01-01 | End: 2021-01-01 | Stop reason: HOSPADM

## 2021-01-01 RX ORDER — SODIUM CHLORIDE 0.9 % (FLUSH) 0.9 %
5-40 SYRINGE (ML) INJECTION PRN
Status: DISCONTINUED | OUTPATIENT
Start: 2021-01-01 | End: 2021-01-01 | Stop reason: HOSPADM

## 2021-01-01 RX ORDER — ACETAMINOPHEN 650 MG/1
650 SUPPOSITORY RECTAL EVERY 6 HOURS PRN
Status: DISCONTINUED | OUTPATIENT
Start: 2021-01-01 | End: 2021-01-01 | Stop reason: HOSPADM

## 2021-01-01 RX ORDER — DEXTROSE MONOHYDRATE 50 MG/ML
100 INJECTION, SOLUTION INTRAVENOUS PRN
Status: DISCONTINUED | OUTPATIENT
Start: 2021-01-01 | End: 2021-01-01 | Stop reason: HOSPADM

## 2021-01-01 RX ORDER — ONDANSETRON 2 MG/ML
4 INJECTION INTRAMUSCULAR; INTRAVENOUS EVERY 6 HOURS PRN
Status: DISCONTINUED | OUTPATIENT
Start: 2021-01-01 | End: 2021-01-01 | Stop reason: HOSPADM

## 2021-01-01 RX ADMIN — VENLAFAXINE HYDROCHLORIDE 37.5 MG: 37.5 CAPSULE, EXTENDED RELEASE ORAL at 09:12

## 2021-01-01 RX ADMIN — CLOPIDOGREL BISULFATE 75 MG: 75 TABLET ORAL at 08:24

## 2021-01-01 RX ADMIN — SODIUM CHLORIDE 250 ML: 9 INJECTION, SOLUTION INTRAVENOUS at 10:00

## 2021-01-01 RX ADMIN — ATORVASTATIN CALCIUM 40 MG: 40 TABLET, FILM COATED ORAL at 21:05

## 2021-01-01 RX ADMIN — FUROSEMIDE 20 MG: 20 TABLET ORAL at 09:10

## 2021-01-01 RX ADMIN — Medication 10 ML: at 21:08

## 2021-01-01 RX ADMIN — SODIUM CHLORIDE: 9 INJECTION, SOLUTION INTRAVENOUS at 13:59

## 2021-01-01 RX ADMIN — CLOPIDOGREL BISULFATE 75 MG: 75 TABLET ORAL at 21:06

## 2021-01-01 RX ADMIN — ACETAMINOPHEN 650 MG: 325 TABLET ORAL at 21:20

## 2021-01-01 RX ADMIN — CLOPIDOGREL BISULFATE 75 MG: 75 TABLET ORAL at 09:37

## 2021-01-01 RX ADMIN — ASPIRIN 81 MG: 81 TABLET, COATED ORAL at 09:12

## 2021-01-01 RX ADMIN — ASPIRIN 81 MG: 81 TABLET, COATED ORAL at 09:37

## 2021-01-01 RX ADMIN — SODIUM CHLORIDE, PRESERVATIVE FREE 10 ML: 5 INJECTION INTRAVENOUS at 21:52

## 2021-01-01 RX ADMIN — Medication 10 ML: at 21:05

## 2021-01-01 RX ADMIN — ZOLEDRONIC ACID 4 MG: 4 INJECTION INTRAVENOUS at 16:01

## 2021-01-01 RX ADMIN — Medication 10 ML: at 09:14

## 2021-01-01 RX ADMIN — SODIUM CHLORIDE: 9 INJECTION, SOLUTION INTRAVENOUS at 15:48

## 2021-01-01 RX ADMIN — SODIUM CHLORIDE, PRESERVATIVE FREE 10 ML: 5 INJECTION INTRAVENOUS at 21:00

## 2021-01-01 RX ADMIN — VENLAFAXINE HYDROCHLORIDE 37.5 MG: 37.5 CAPSULE, EXTENDED RELEASE ORAL at 09:16

## 2021-01-01 RX ADMIN — PANTOPRAZOLE SODIUM 40 MG: 40 TABLET, DELAYED RELEASE ORAL at 06:06

## 2021-01-01 RX ADMIN — FENTANYL CITRATE 100 MCG: 50 INJECTION, SOLUTION INTRAMUSCULAR; INTRAVENOUS at 10:21

## 2021-01-01 RX ADMIN — FUROSEMIDE 20 MG: 10 INJECTION, SOLUTION INTRAVENOUS at 13:38

## 2021-01-01 RX ADMIN — SODIUM CHLORIDE: 9 INJECTION, SOLUTION INTRAVENOUS at 07:41

## 2021-01-01 RX ADMIN — PANTOPRAZOLE SODIUM 40 MG: 40 TABLET, DELAYED RELEASE ORAL at 06:49

## 2021-01-01 RX ADMIN — CEFTRIAXONE SODIUM 1000 MG: 1 INJECTION, POWDER, FOR SOLUTION INTRAMUSCULAR; INTRAVENOUS at 08:06

## 2021-01-01 RX ADMIN — SPIRONOLACTONE 50 MG: 25 TABLET ORAL at 09:10

## 2021-01-01 RX ADMIN — ENOXAPARIN SODIUM 40 MG: 40 INJECTION SUBCUTANEOUS at 09:13

## 2021-01-01 RX ADMIN — ENOXAPARIN SODIUM 40 MG: 40 INJECTION SUBCUTANEOUS at 21:08

## 2021-01-01 RX ADMIN — ASPIRIN 81 MG: 81 TABLET, COATED ORAL at 21:05

## 2021-01-01 RX ADMIN — POTASSIUM CHLORIDE 40 MEQ: 1500 TABLET, EXTENDED RELEASE ORAL at 17:19

## 2021-01-01 RX ADMIN — VENLAFAXINE HYDROCHLORIDE 37.5 MG: 37.5 CAPSULE, EXTENDED RELEASE ORAL at 08:22

## 2021-01-01 RX ADMIN — FUROSEMIDE 20 MG: 10 INJECTION, SOLUTION INTRAVENOUS at 09:12

## 2021-01-01 RX ADMIN — VENLAFAXINE HYDROCHLORIDE 37.5 MG: 37.5 CAPSULE, EXTENDED RELEASE ORAL at 09:36

## 2021-01-01 RX ADMIN — CIPROFLOXACIN 400 MG: 2 INJECTION, SOLUTION INTRAVENOUS at 16:50

## 2021-01-01 RX ADMIN — PANTOPRAZOLE SODIUM 40 MG: 40 TABLET, DELAYED RELEASE ORAL at 06:14

## 2021-01-01 RX ADMIN — ASPIRIN 81 MG: 81 TABLET, COATED ORAL at 08:22

## 2021-01-01 RX ADMIN — POTASSIUM CHLORIDE 40 MEQ: 1500 TABLET, EXTENDED RELEASE ORAL at 17:39

## 2021-01-01 RX ADMIN — SALINE NASAL SPRAY 1 SPRAY: 1.5 SOLUTION NASAL at 21:07

## 2021-01-01 RX ADMIN — ENOXAPARIN SODIUM 40 MG: 40 INJECTION SUBCUTANEOUS at 08:22

## 2021-01-01 RX ADMIN — SODIUM CHLORIDE: 9 INJECTION, SOLUTION INTRAVENOUS at 01:38

## 2021-01-01 RX ADMIN — ALBUMIN (HUMAN) 50 G: 0.25 INJECTION, SOLUTION INTRAVENOUS at 13:41

## 2021-01-01 RX ADMIN — SODIUM CHLORIDE: 9 INJECTION, SOLUTION INTRAVENOUS at 19:25

## 2021-01-01 RX ADMIN — LIDOCAINE HYDROCHLORIDE 10 ML: 20 INJECTION, SOLUTION INFILTRATION; PERINEURAL at 11:17

## 2021-01-01 RX ADMIN — PANTOPRAZOLE SODIUM 40 MG: 40 TABLET, DELAYED RELEASE ORAL at 05:49

## 2021-01-01 RX ADMIN — CALCITONIN SALMON 100 UNITS: 200 INJECTION, SOLUTION INTRAMUSCULAR; SUBCUTANEOUS at 12:48

## 2021-01-01 RX ADMIN — Medication 10 ML: at 10:21

## 2021-01-01 RX ADMIN — BACITRACIN ZINC, NEOMYCIN SULFATE, POLYMYXIN B SULFATE 1 EACH: 3.5; 5000; 4 OINTMENT TOPICAL at 10:39

## 2021-01-01 RX ADMIN — LACTULOSE 20 G: 20 SOLUTION ORAL at 08:07

## 2021-01-01 RX ADMIN — PANTOPRAZOLE SODIUM 40 MG: 40 TABLET, DELAYED RELEASE ORAL at 06:05

## 2021-01-01 RX ADMIN — ENOXAPARIN SODIUM 40 MG: 40 INJECTION SUBCUTANEOUS at 09:11

## 2021-01-01 RX ADMIN — POTASSIUM CHLORIDE 40 MEQ: 1500 TABLET, EXTENDED RELEASE ORAL at 14:04

## 2021-01-01 RX ADMIN — DEXTROSE AND SODIUM CHLORIDE: 5; 900 INJECTION, SOLUTION INTRAVENOUS at 21:51

## 2021-01-01 RX ADMIN — SODIUM CHLORIDE: 9 INJECTION, SOLUTION INTRAVENOUS at 20:36

## 2021-01-01 RX ADMIN — ATORVASTATIN CALCIUM 40 MG: 40 TABLET, FILM COATED ORAL at 22:44

## 2021-01-01 RX ADMIN — ATORVASTATIN CALCIUM 40 MG: 40 TABLET, FILM COATED ORAL at 21:07

## 2021-01-01 RX ADMIN — ASPIRIN 81 MG: 81 TABLET, COATED ORAL at 09:16

## 2021-01-01 RX ADMIN — CLOPIDOGREL BISULFATE 75 MG: 75 TABLET ORAL at 09:10

## 2021-01-01 RX ADMIN — SODIUM CHLORIDE: 9 INJECTION, SOLUTION INTRAVENOUS at 06:50

## 2021-01-01 RX ADMIN — ENOXAPARIN SODIUM 30 MG: 30 INJECTION, SOLUTION INTRAVENOUS; SUBCUTANEOUS at 21:51

## 2021-01-01 RX ADMIN — VENLAFAXINE HYDROCHLORIDE 37.5 MG: 37.5 CAPSULE, EXTENDED RELEASE ORAL at 08:24

## 2021-01-01 RX ADMIN — Medication 10 ML: at 08:23

## 2021-01-01 RX ADMIN — MAGNESIUM SULFATE HEPTAHYDRATE 2000 MG: 40 INJECTION, SOLUTION INTRAVENOUS at 09:13

## 2021-01-01 RX ADMIN — CALCITONIN SALMON 100 UNITS: 200 INJECTION, SOLUTION INTRAMUSCULAR; SUBCUTANEOUS at 09:42

## 2021-01-01 RX ADMIN — SODIUM CHLORIDE: 9 INJECTION, SOLUTION INTRAVENOUS at 15:01

## 2021-01-01 RX ADMIN — FUROSEMIDE 20 MG: 10 INJECTION, SOLUTION INTRAMUSCULAR; INTRAVENOUS at 17:35

## 2021-01-01 RX ADMIN — LACTULOSE 20 G: 20 SOLUTION ORAL at 22:24

## 2021-01-01 RX ADMIN — LIDOCAINE HYDROCHLORIDE 6 ML: 20 INJECTION, SOLUTION INFILTRATION; PERINEURAL at 10:25

## 2021-01-01 RX ADMIN — LIDOCAINE HYDROCHLORIDE 10 ML: 20 INJECTION, SOLUTION INFILTRATION; PERINEURAL at 10:31

## 2021-01-01 RX ADMIN — ASPIRIN 81 MG: 81 TABLET, COATED ORAL at 08:24

## 2021-01-01 RX ADMIN — MIDAZOLAM HYDROCHLORIDE 1 MG: 1 INJECTION INTRAMUSCULAR; INTRAVENOUS at 10:21

## 2021-01-01 RX ADMIN — SODIUM CHLORIDE: 9 INJECTION, SOLUTION INTRAVENOUS at 11:56

## 2021-01-01 RX ADMIN — CLOPIDOGREL BISULFATE 75 MG: 75 TABLET ORAL at 09:12

## 2021-01-01 RX ADMIN — ENOXAPARIN SODIUM 40 MG: 40 INJECTION SUBCUTANEOUS at 08:24

## 2021-01-01 RX ADMIN — ATORVASTATIN CALCIUM 40 MG: 40 TABLET, FILM COATED ORAL at 20:36

## 2021-01-01 RX ADMIN — CLOPIDOGREL BISULFATE 75 MG: 75 TABLET ORAL at 08:22

## 2021-01-01 RX ADMIN — POTASSIUM CHLORIDE 40 MEQ: 1500 TABLET, EXTENDED RELEASE ORAL at 09:12

## 2021-01-01 ASSESSMENT — ENCOUNTER SYMPTOMS
WHEEZING: 0
GASTROINTESTINAL NEGATIVE: 1
WHEEZING: 0
ABDOMINAL DISTENTION: 1
SHORTNESS OF BREATH: 0
EYE DISCHARGE: 0
ABDOMINAL DISTENTION: 1
APNEA: 0
EYES NEGATIVE: 1
EYES NEGATIVE: 1
ALLERGIC/IMMUNOLOGIC NEGATIVE: 1
PHOTOPHOBIA: 0
VOMITING: 0
COUGH: 0
RESPIRATORY NEGATIVE: 1
RESPIRATORY NEGATIVE: 1
CONSTIPATION: 0
RHINORRHEA: 0
ABDOMINAL PAIN: 1
EYES NEGATIVE: 1
SHORTNESS OF BREATH: 0
VOMITING: 0
DIARRHEA: 0
BACK PAIN: 0
GASTROINTESTINAL NEGATIVE: 1
EYES NEGATIVE: 1
TROUBLE SWALLOWING: 0
NAUSEA: 0
NAUSEA: 0
ALLERGIC/IMMUNOLOGIC NEGATIVE: 1
ABDOMINAL DISTENTION: 1
ABDOMINAL PAIN: 0
RESPIRATORY NEGATIVE: 1

## 2021-01-01 ASSESSMENT — PAIN SCALES - GENERAL
PAINLEVEL_OUTOF10: 0
PAINLEVEL_OUTOF10: 10
PAINLEVEL_OUTOF10: 0
PAINLEVEL_OUTOF10: 0

## 2021-01-01 ASSESSMENT — PAIN SCALES - WONG BAKER
WONGBAKER_NUMERICALRESPONSE: 0
WONGBAKER_NUMERICALRESPONSE: 0

## 2021-04-09 PROBLEM — R10.9 ABDOMINAL PAIN: Status: ACTIVE | Noted: 2021-01-01

## 2021-04-09 NOTE — ACP (ADVANCE CARE PLANNING)
Advance Care Planning     Advance Care Planning Activator (Inpatient)  Conversation Note      Date of ACP Conversation: 4/9/2021    Conversation Conducted with: Patient with Decision Making Capacity    ACP Activator: [facility] Decision Maker:     Current Designated Health Care Decision Maker:     Primary Decision Maker: Nasim Meek - Niece/Nephew - [phone]    Secondary Decision Maker: John Fernandez - Niece/Nephew - [phone]     Click here to complete Healthcare Decision Makers including section of the Healthcare Decision Maker Relationship (ie \"Primary\")  Today we documented Decision Maker(s) consistent with Legal Next of Kin hierarchy. Care Preferences    Ventilation: \"If you were in your present state of health and suddenly became very ill and were unable to breathe on your own, what would your preference be about the use of a ventilator (breathing machine) if it were available to you? \"      Would the patient desire the use of ventilator (breathing machine)?: yes    \"If your health worsens and it becomes clear that your chance of recovery is unlikely, what would your preference be about the use of a ventilator (breathing machine) if it were available to you? \"     Would the patient desire the use of ventilator (breathing machine)?: Yes    Resuscitation  \"CPR works best to restart the heart when there is a sudden event, like a heart attack, in someone who is otherwise healthy. Unfortunately, CPR does not typically restart the heart for people who have serious health conditions or who are very sick. \"    \"In the event your heart stopped as a result of an underlying serious health condition, would you want attempts to be made to restart your heart (answer \"yes\" for attempt to resuscitate) or would you prefer a natural death (answer \"no\" for do not attempt to resuscitate)? \" yes     [x] Yes   [] No   Educated Patient / Decision Maker regarding differences between Advance Directives and

## 2021-04-09 NOTE — H&P
Department of Internal Medicine  General Internal Medicine  Attending History and Physical      CHIEF COMPLAINT:  Weakness    Reason for Admission:  New cirrhosis, ascites    History Obtained From:  patient, spouse    HISTORY OF PRESENT ILLNESS:      The patient is a 63 y.o. male with significant past medical history of DM2 not on medications who presents with his wife for increased lethargy, weakness and decreased appetite for the last couple of weeks. Pt and wife are poor historians. Pt denies complaints and seems to have no concept that how weak he is is not normal. Wife states that he has been on disability for about 2 years due to a stroke he had. Pt is very noncompliant and refuses to take any medications. Was recently diagnosed with diabetes but has refused to take any medications for that. States that he was just seen in the office on Wednesday and the doctor prescribed him some medications but they did not pick them up yet to start. Pt denies any abdominal discomfort despite wife stating that his belly has gotten more distended. Pt states that he is constipated a lot. States that he has been having black outs where he can't remember how he got to a specific point. States that he has had to cut out certain activities like driving for this reason. States he rarely drinks alcohol, smokes marijuana and denies any other illicit drugs. In the ED, labs showed mildly elevated LFTs, hypercalcemia, mild lactate, but otherwise pretty unremarkable. CT abd obtained due to abd distension and noted Cirrhotic liver morphology, Large volume ascites. Small pleural effusions. Anasarca. Pt admitted for further management due to new onset cirrhosis and ascites. Past Medical History:        Diagnosis Date    Diabetes mellitus ([facility] [address]     Hypertension      Past Surgical History:    No past surgical history on file.       Medications Prior to Admission:    Medications Prior to Admission: albuterol sulfate HFA (VENTOLIN HFA) 108 (90 Base) MCG/ACT inhaler, Inhale 2 puffs into the lungs every 6 hours as needed for Shortness of Breath  aspirin 81 MG EC tablet, Take 1 tablet by mouth daily  atorvastatin (LIPITOR) 40 MG tablet, Take 1 tablet by mouth nightly  clopidogrel (PLAVIX) 75 MG tablet, Take 1 tablet by mouth daily  venlafaxine (EFFEXOR XR) 37.5 MG extended release capsule, Take 1 capsule by mouth daily (with breakfast)  glyBURIDE (DIABETA) 5 MG tablet, Take 10 mg by mouth 2 times daily (with meals)  lisinopril (PRINIVIL;ZESTRIL) 20 MG tablet, Take 20 mg by mouth daily  metFORMIN (GLUCOPHAGE) 1000 MG tablet, Take 1,000 mg by mouth 2 times daily (with meals)    Allergies:  Codeine    Social History: Former smoker (vapes now), rare etoh, marijuana, denies illicit drugs    Family History:   No family history on file. REVIEW OF SYSTEMS:  12 point ROS was negative unless otherwise noted in the HPI   PHYSICAL EXAM:    Vitals:  BP (!) 151/89   Pulse 107   Temp 97.3 °F (36.3 °C) (Oral)   Resp 16   Ht 5' 9\" (1.753 m)   Wt 240 lb (108.9 kg)   SpO2 98%   BMI 35.44 kg/m²     Constitutional: Awake and alert in no acute distress.  Lying in bed comfortably  Head: Normocephalic, atraumatic  Eyes: EOMI, PERRLA  ENT: moist mucous membranes  Neck: neck supple, trachea midline  Lungs: Good inspiratory effort, CTABL, no wheeze, no rhonchi, no rales  Heart: RRR, normal S1 and S2  GI: Soft, distended, non tender, no guarding, no rebound, +BS  MSK: no edema noted  Skin: warm, dry  Psych: strange affect       DATA:  CBC:   Lab Results   Component Value Date    WBC 6.5 04/09/2021    RBC 4.99 04/09/2021    HGB 14.4 04/09/2021    HCT 42.6 04/09/2021    MCV 85.3 04/09/2021    MCH 28.8 04/09/2021    MCHC 33.7 04/09/2021    RDW 16.5 04/09/2021     04/09/2021    MPV 10.6 09/10/2013     CMP:    Lab Results   Component Value Date     04/09/2021    K 3.4 04/09/2021    K 4.2 04/24/2018    CL 97 04/09/2021    CO2 24 04/09/2021    BUN 32 04/09/2021 CREATININE 1.78 04/09/2021    GFRAA 47.1 04/09/2021    LABGLOM 39.0 04/09/2021    GLUCOSE 103 04/09/2021    PROT 7.0 04/09/2021    LABALBU 3.0 04/09/2021    CALCIUM 14.6 04/09/2021    BILITOT 1.7 04/09/2021    ALKPHOS 142 04/09/2021    AST 74 04/09/2021    ALT 25 04/09/2021     ASSESSMENT AND PLAN:      # New onset cirrhosis with ascites and associate lethargy/weakness  - CT with nodularity of liver consistent with cirrhosis and large ascites  - abd soft, but distended. IR consulted for possible paracentesis  - US RUQ ordered   - hepatitis panel ordered  - GI consulted  - PT/OT    # DELILAH with hypercalcemia  - last labs in 2018 were normal  - today creatinine up to 1.78  - nephrology consulted  - possibly hepatorenal vs dehydration due to poor po intake  - albumin low and anasarca noted on CT    # DM2  - ISS    # Hx of CVA  - cont DAPT, statin    DVT: lovenox    Disposition: Pt with increasing lethargy/weakness and noted to have new cirrhosis on CT. GI consulted to help with further workup. PT/OT. Anticipated length of stay greater than 48 hours.     Remy Almonte DO  Internal Medicine

## 2021-04-09 NOTE — CARE COORDINATION
[facility] EMERGENCY [facility] Case Management   Initial Discharge Assessment    Met with patient and his niece Kat at bedside to discuss patient's baseline status, available resources and support system, and potential discharge plan. PCP: Sukhi Montesinos MD                                  Date of Last Visit:  April 2021  If no PCP, list provided? N/A    Discharge Planning    Living Arrangements: Patient lives independently at home in a studio apartment with elevator access and no stairs to enter. Who do you live with? Patient lives alone. Who helps you with your care: At baseline, patient had been independent with occasional assist (i.e. with transportation) from his niece or nephew. If lives at home:  Do you have any barriers navigating in your home? No    Patient can perform ADL? Yes    Current Services (outpatient and in home) :  None    Dialysis: No    Is transportation available to get to your appointments? Yes - Niece Grisel Logan can transport and patient drives at baseline. DME Equipment:  None    Respiratory equipment: None    Respiratory provider:  RASHAAD     Pharmacy:  [facility] with Medication Assistance Program?  No      Patient agreeable to [facility]? Yes, Company - List provided and discussed with patient and his niece. Patient agreeable to SNF/Rehab? No    Other discharge needs identified? Other - TBD based on work-up and needs at [place]. Patient would benefit from [address] for nursing and therapy. Freedom of choice list provided with basic dialogue that supports the patient's individualized plan of care/goals and shares the quality data associated with the providers. Yes    Does Patient Have a High-Risk for Readmission Diagnosis (CHF, PN, MI, COPD)? No     History: DM, htn, CVA, ascites.     The plan for Transition of Care is related to the following treatment goals: Evaluation and management of generalized weakness, hepatic disease with increasing abdominal distension. Initial Discharge Plan? (Note: please see concurrent daily documentation for any updates after initial note). Home alone, family assist PRN, HHC if ordered/recommended. The Patient and/or patient representative: Patient and his niece were provided with choice of any post-acute providers for care and equipment and agrees with tentative discharge plan to home.     Electronically signed by Jeremias Betancourt RN on 4/9/2021 at 4:22 PM

## 2021-04-09 NOTE — ED NOTES
Lab at bedside for bc x 2 reports called to floor spoke with juan coreas update given     Janey Bowen.  Yamil Ndiaye  04/09/21 9968

## 2021-04-09 NOTE — ED NOTES
Bed: 06  Expected date: 4/9/21  Expected time:   Means of arrival:   Comments:  63 male ascites confused missed liver scan yesterday Vu Gilliland.  Ronak Pinzon RN  04/09/21 2836

## 2021-04-09 NOTE — ED TRIAGE NOTES
Pt a/o x 3 skin pale w/d resp non labored. Pt arrived via ems from home with c/o ascites and missed his liver scan yesterday. Pt denies pain at present. O/e pt's abd large round slt firm. 3 + pitting edema to bilat lower legs.

## 2021-04-09 NOTE — ED PROVIDER NOTES
[facility] ED  eMERGENCY dEPARTMENT eNCOUnter      Pt Name: Mitchell Vasquez  MRN: [id number]  [place] 1960  Date of evaluation: 4/9/2021  Provider: Heather Joe MD    [address] [place] [address]       Chief Complaint   Patient presents with    Hepatic Disease     ascites         HISTORY OF PRESENT ILLNESS   (Location/Symptom, Timing/Onset,Context/Setting, Quality, Duration, Modifying Factors, Severity)  Note limiting factors. Mitchell Vasquez is a 63 y.o. male who presents to the emergency department with complaint of increasing generalized weakness, not feeling himself, and increasing abdominal distention. Patient admits he was to his doctor recently for setting up for a CAT scan of his abdomen. Patient admits he does not drink significant quantities of alcohol. Patient denies substance use, patient admits tobacco use. Patient presents to the emergency department this morning status post complaint of fall and increasing weakness along with increasing abdominal swelling. Patient denies nausea, vomiting, fevers or chills. Patient denies shortness of breath or chest pain. Patient admits periods of some degree of confusion. HPI    NursingNotes were reviewed. REVIEW OF SYSTEMS    (2-9 systems for level 4, 10 or more for level 5)     Review of Systems   Constitutional: Positive for activity change, appetite change and fatigue. Negative for chills and fever. HENT: Negative for congestion, ear pain, rhinorrhea and trouble swallowing. Eyes: Negative. Respiratory: Negative for shortness of breath and wheezing. Cardiovascular: Negative for chest pain and leg swelling. Gastrointestinal: Positive for abdominal distention. Negative for abdominal pain, nausea and vomiting. Endocrine: Negative. Genitourinary: Negative for dysuria, frequency and hematuria. Musculoskeletal: Positive for gait problem. Negative for neck pain. Skin: Positive for pallor. Allergic/Immunologic: Negative. General: Bowel sounds are normal. There is distension. Palpations: Abdomen is soft. Tenderness: There is no abdominal tenderness. There is no guarding or rebound. Hernia: No hernia is present. Musculoskeletal: Normal range of motion. Right lower leg: Edema present. Left lower leg: Edema present. Skin:     General: Skin is warm and dry. Capillary Refill: Capillary refill takes less than 2 seconds. Coloration: Skin is not jaundiced or pale. Findings: No bruising or erythema. Neurological:      General: No focal deficit present. Mental Status: He is alert and oriented to person, place, and time. Psychiatric:         Speech: Speech normal.         Behavior: Behavior normal.         Thought Content: Thought content normal.         Judgment: Judgment normal.         DIAGNOSTIC RESULTS     EKG: All EKG's are interpreted by the Emergency Department Physician who either signs or Co-signsthis chart in the absence of a cardiologist.    EKG demonstrates sinus rhythm. Rate is 110. Low voltage is noted. PACs noted. This is an abnormal overall EKG however largely nonacute. RADIOLOGY:   Non-plain filmimages such as CT, Ultrasound and MRI are read by the radiologist. Plain radiographic images are visualized and preliminarily interpreted by the emergency physician with the below findings:    T imaging of the brain as noted by radiologist unremarkable evidence to pathology. Please see radiologist rotation. CT imaging of abdomen and pelvis demonstrates significant liver cirrhosis, multiple scattered low-attenuation lesions, and large volume ascites. Please see radiologist rotation full detail. Single view chest x-ray is interpreted by myself demonstrates evidence of pleural effusion bilaterally right greater than left. Mediastinum is largely unremarkable. Otherwise clear pulmonary fields. Normal bony structure.     Interpretation per the Radiologist below, if available at the time ofthis note:    CT Head WO Contrast   Final Result      No acute intracranial process. CT ABDOMEN PELVIS WO CONTRAST Additional Contrast? None   Final Result      Cirrhotic liver morphology. Evaluation for liver lesions limited without contrast, apparent indeterminant low-attenuation lesions within the liver. Large volume ascites. Small pleural effusions. Anasarca. Mesenteric and retroperitoneal lymphadenopathy, nonspecific. Differential includes reactive process, infectious/inflammatory process, lymphoproliferative disorder (such as lymphoma), neoplastic process, etc.      Feces throughout the colon, especially within the rectal region, suggestive of constipation, better assessed clinically.       Right nephrolithiasis.            ==========               XR CHEST PORTABLE    (Results Pending)         ED BEDSIDE ULTRASOUND:   Performed by ED Physician - none    LABS:  Labs Reviewed   COMPREHENSIVE METABOLIC PANEL - Abnormal; Notable for the following components:       Result Value    Glucose 103 (*)     BUN 32 (*)     CREATININE 1.78 (*)     GFR Non- 39.0 (*)     GFR  47.1 (*)     Calcium 14.6 (*)     Albumin 3.0 (*)     Total Bilirubin 1.7 (*)     Alkaline Phosphatase 142 (*)     AST 74 (*)     Globulin 4.0 (*)     All other components within normal limits    Narrative:     CALL  R[facility]  [facility] tel. [phone],  [facility] results called to and read back by DR Bisi Aguilar, 04/09/2021 12:57, by Shiloh Spivey results called to and read back by DR Bisi Aguilar, 04/09/2021 12:51, by Aníbal Richmond   CBC WITH AUTO DIFFERENTIAL - Abnormal; Notable for the following components:    RDW 16.5 (*)     Platelets 468 (*)     Lymphocytes Absolute 0.4 (*)     All other components within normal limits   LACTIC ACID, PLASMA - Abnormal; Notable for the following components:    Lactic Acid 2.4 (*)     All other components within normal limits   TROPONIN - Abnormal; Notable for the following CONSULTS:  None    PROCEDURES:  Unless otherwise noted below, none     Procedures    FINAL IMPRESSION      1. Ascites of liver    2. Generalized weakness    3. Intermittent confusion    4. Cirrhosis of liver with ascites, unspecified hepatic cirrhosis type ([facility] [address]    5. Renal insufficiency        DISPOSITION/PLAN   DISPOSITION Decision To Admit 04/09/2021 02:11:11 PM      PATIENT REFERRED TO:  No follow-up provider specified.     DISCHARGE MEDICATIONS:  New Prescriptions    No medications on file          (Please note that portions of this note were completed with a voice recognitionprogram.  Efforts were made to edit the dictations but occasionally words are mis-transcribed.)    Fabi Lowery MD (electronically signed)  Attending Emergency Physician          Fabi Lowery MD  04/09/21 4112

## 2021-04-10 NOTE — CONSULTS
3    clopidogrel (PLAVIX) 75 MG tablet Take 1 tablet by mouth daily 30 tablet 3    venlafaxine (EFFEXOR XR) 37.5 MG extended release capsule Take 1 capsule by mouth daily (with breakfast) 30 capsule 3    glyBURIDE (DIABETA) 5 MG tablet Take 10 mg by mouth 2 times daily (with meals)      lisinopril (PRINIVIL;ZESTRIL) 20 MG tablet Take 20 mg by mouth daily      metFORMIN (GLUCOPHAGE) 1000 MG tablet Take 1,000 mg by mouth 2 times daily (with meals)         Allergies:  Codeine    Social History:    Social History     Socioeconomic History    Marital status:      Spouse name: Not on file    Number of children: Not on file    Years of education: Not on file    Highest education level: Not on file   Occupational History    Not on file   Social Needs    Financial resource strain: Not on file    Food insecurity     Worry: Not on file     Inability: Not on file    Transportation needs     Medical: Not on file     Non-medical: Not on file   Tobacco Use    Smoking status: Current Every Day Smoker     Packs/day: 1.00     Types: E-Cigarettes    Smokeless tobacco: Never Used   Substance and Sexual Activity    Alcohol use: Yes     Comment: occassionally     Drug use: Yes     Types: Marijuana    Sexual activity: Not on file   Lifestyle    Physical activity     Days per week: Not on file     Minutes per session: Not on file    Stress: Not on file   Relationships    Social connections     Talks on phone: Not on file     Gets together: Not on file     Attends [employer]ism service: Not on file     Active member of club or organization: Not on file     Attends meetings of clubs or organizations: Not on file     Relationship status: Not on file    Intimate partner violence     Fear of current or ex partner: Not on file     Emotionally abused: Not on file     Physically abused: Not on file     Forced sexual activity: Not on file   Other Topics Concern    Not on file   Social History Narrative    Not on file Family History:   No family history on file. Review of Systems:   Review of Systems   Constitutional: Positive for fatigue. Negative for activity change. HENT: Negative for congestion. Eyes: Negative for discharge. Respiratory: Negative for apnea. Cardiovascular: Negative for chest pain. Gastrointestinal: Positive for abdominal distention. Endocrine: Negative for cold intolerance. Genitourinary: Negative for difficulty urinating. Allergic/Immunologic: Negative for environmental allergies. Neurological: Negative for dizziness. Hematological: Negative for adenopathy. Psychiatric/Behavioral: Negative for agitation. Physical exam:   Constitutional:  VITALS:  BP (!) 162/99   Pulse 97   Temp 97.3 °F (36.3 °C)   Resp 18   Ht 5' 9\" (1.753 m)   Wt 240 lb (108.9 kg)   SpO2 91%   BMI 35.44 kg/m²   Gen: alert, awake, nad  Skin: no rash, turgor wnl  Heent:  eomi, mmm  Neck: no bruits or jvd noted, thyroid normal  Lungs:  Normal expansion. Clear to auscultation. No rales, rhonchi, or wheezing. Heart:  Heart sounds are normal.  Regular rate and rhythm without murmur, gallop or rub. Abdomen: Fluid wave  Extremities: Extremities warm to touch, pink, with no edema.   Psychiatric: mood and affect appropriate; judgement and insight intact  Musculoskeletal:  Rom, muscular strength intact; digits, nails normal    Data/  CBC:   Lab Results   Component Value Date    WBC 5.6 04/10/2021    RBC 4.60 04/10/2021    HGB 13.2 04/10/2021    HCT 38.8 04/10/2021    MCV 84.5 04/10/2021    MCH 28.8 04/10/2021    MCHC 34.0 04/10/2021    RDW 16.5 04/10/2021    PLT 94 04/10/2021    MPV 10.6 09/10/2013     BMP:    Lab Results   Component Value Date     04/10/2021    K 3.4 04/10/2021     04/10/2021    CO2 22 04/10/2021    BUN 32 04/10/2021    LABALBU 3.0 04/09/2021    CREATININE 1.53 04/10/2021    CALCIUM 14.0 04/10/2021    GFRAA 56.1 04/10/2021    LABGLOM 46.4 04/10/2021    GLUCOSE 82 04/10/2021 Ct Abdomen Pelvis Wo Contrast Additional Contrast? None    Result Date: 4/9/2021  EXAMINATION:  CT ABDOMEN PELVIS WO CONTRAST HISTORY:   Ascites, new onset weakness, fall, abdominal pain. TECHNIQUE: Non-IV contrast imaging of the abdomen and pelvis was performed using standard technique, scanning from just above the dome of the diaphragm to the symphysis pubis. Including coronal and sagittal reconstructions. Unenhanced imaging is limited for the evaluation of some intra-abdominal and pelvic pathology. Contrast: IV: None Oral:  None. All CT scans at this facility use dose modulation, iterative reconstruction, and/or weight based dosing when appropriate to reduce radiation dose to as low as reasonably achievable. COMPARISON: None. RESULT: Abdomen / Pelvis: Liver: Cirrhotic liver morphology. Heterogeneous appearance of the liver. Assessment for hepatic lesions limited without contrast. Apparent low-attenuation lesions within the liver. Biliary: Extensive cholelithiasis. No distinct gallbladder wall thickening. Gallbladder mildly distended. Pancreas: Unremarkable. Spleen: Splenomegaly measuring around 15.0 cm. Adrenals: No mass. Kidneys: 3 mm calculus within the right lower pole. Punctate interpolar right-sided calculus. Other probable vascular calcifications. No hydronephrosis. No suspicious renal lesion within the unenhanced kidneys. GI Tract: Feces throughout the colon, especially within the rectal region. Diverticulosis, without evidence for diverticulitis. No small bowel dilation. Lymph Nodes: Multiple enlarged bulky mesenteric lymph nodes, with large conglomerate at the level of the mesenteric root. For example in the mid upper abdomen lymph node measuring approximately 3.7 x 1.9 cm (series 2 image 27). Enlarged retroperitoneal lymph nodes. For example para-aortic lymph node measuring 2.9 x 2.3 cm (series 2 image 44).  Mesentery/peritoneum/retroperitoneum: Large volume ascites, especially involving the perihepatic and perisplenic regions, paracolic gutters, and tracking to the pelvis. No loculated collection. Vasculature: Mild arterial atherosclerotic disease without aneurysm. Apparent mesenteric collateral vessels. Pelvis: Ascites tracks into the pelvis. No bladder wall thickening. Small fat-containing periumbilical hernia. Bones: No acute osseous findings. No destructive osseous lesions. Osteopenia. Bridging endplate osteophytes within the spine. Soft tissues: Anasarca. Lower thorax: Small bilateral pleural effusions. Coronary stent/calcifications. Cirrhotic liver morphology. Evaluation for liver lesions limited without contrast, apparent indeterminant low-attenuation lesions within the liver. Large volume ascites. Small pleural effusions. Anasarca. Mesenteric and retroperitoneal lymphadenopathy, nonspecific. Differential includes reactive process, infectious/inflammatory process, lymphoproliferative disorder (such as lymphoma), neoplastic process, etc. Feces throughout the colon, especially within the rectal region, suggestive of constipation, better assessed clinically. Right nephrolithiasis. ==========     Ct Head Wo Contrast    Result Date: 4/9/2021  EXAMINATION:  CT HEAD WO CONTRAST HISTORY:  Fall. Confusion. TECHNIQUE:  Serial axial images without IV contrast were obtained from the vertex to the foramen magnum. Sagittal and coronal reconstructions. All CT scans at this facility use dose modulation, iterative reconstruction, and/or weight based dosing when appropriate to reduce radiation dose to as low as reasonably achievable. COMPARISON:  CT 7/29/2018 RESULT: Acute change:   No evidence of an acute infarct or other acute parenchymal process. Hemorrhage:    No evidence of acute intracranial hemorrhage. Mass Lesion / Mass Effect:   There is no evidence of an intracranial mass or extraaxial fluid collection. No significant mass effect.   Chronic change:   Scattered patchy foci of low attenuation are present within supratentorial white matter which is a nonspecific finding but likely represents mild microvascular ischemia. Parenchyma: There is mild to moderate generalized volume loss. Encephalomalacia adjacent to the right caudate, unchanged. Ventricles:   Ventricular enlargement concordant with the degree of parenchymal volume loss. Paranasal sinuses and skull base:  Mild mucosal thickening within the ethmoid air cells and maxillary sinuses. Fluid within the right mastoid air cells. The skull base is unremarkable. Soft tissues unremarkable. No acute intracranial process. Xr Chest Portable    Result Date: 4/9/2021  EXAMINATION: CHEST PORTABLE VIEW  CLINICAL HISTORY: Weakness COMPARISONS: None  FINDINGS: Single  views of the chest is submitted. The cardiac silhouette is of normal size configuration. Pulmonary vascular unremarkable. Right sided trachea. Bibasilar areas atelectasis, patchy infiltrates in the bases. Trace/ small right pleural effusion  No Pneumothoraces. BIBASILAR AREAS ATELECTASIS, PATCHY INFILTRATES IN THE BASES. TRACE/SMALL RIGHT PLEURAL EFFUSION    Us Abdomen Limited Specify Organ? Liver, Gallbladder, Pancreas    Result Date: 4/9/2021  EXAMINATION:  US ABDOMEN LIMITED CLINICAL HISTORY:  cirrhosis, abd pain . Camila Aubrey COMPARISONS:  NONE AVAILABLE TECHNIQUE:  Transabdominal ultrasound the rectal quadrant. FINDINGS:  The liver shows a a nodularity to the margin. This a nonspecific finding can be seen with diffuse infiltrative process or cirrhosis. There is a coarse heterogeneous echotexture to the parenchyma. No discrete masses are identified. No intrahepatic biliary  dilatation. The gallbladder shows no surrounding pericholecystic fluid. The wall is mildly thickened measuring 4 mm.  There is hyperechoic foci with posterior acoustic shadowing likely represent a combination of cholelithiasis and gallbladder sludge. Common bile duct  measures 3 mm. There is diffuse ascites present. 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 1. THE LIVER IS NODULAR. THIS A NONSPECIFIC AND CAN BE SEEN WITH BENIGN DIFFUSELY INFILTRATIVE PROCESSES AND/OR CIRRHOSIS. . 2. THERE IS A DIFFUSE HETEROGENEOUS ECHOTEXTURE TO THE LIVER. NO DEFINITIVE NODULE IS NOTED. HOWEVER ULTRASOUND IS LIMITED IN ITS ABILITY TO DIFFERENTIATE SOFT TISSUE OF SIMILAR ECHOGENICITY. CLINICALLY INDICATED COULD CONSIDER MRI TO FURTHER EVALUATE. 3. GALLBLADDER SLUDGE AND STONES. 4 THERE ARE FINDINGS OF THROMBUS OF THE MIDDLE, LEFT AND RIGHT PORTAL VEINS      Assessment/  18-year-old man with diabetes mellitus and hypertension not taking any medicines nor having any labs done over the last few years. Has a new hypercalcemia with a calcium level of 14, acute renal failure, hypokalemia with cirrhotic appearing liver as well as ascites. He says about 60 pound weight loss. The combination of the hypercalcemia and significant weight loss is concerning for underlying malignancy. Plan/  1-hydrated 100 cc an hour for hypercalcemia  2-add calcitonin for just 1 day  3-likely give Zometa or bisphosphonate in the next day or 2  4-check PTH, PTH related peptide, vitamin D levels, phosphorus levels as well as SPEP  5-plan is for paracentesis. We will follow up on cytology of this    Thank you for the consultation. Please do not hesitate to call with questions.     Renetta Navarro

## 2021-04-10 NOTE — PROGRESS NOTES
[facility] EMERGENCY [facility] AT [place] Respiratory Therapy Evaluation   Current Order:  Duoneb QID PRN     Home Regimen: PRN MDI      Ordering Physician: Bc  Re-evaluation Date:      Diagnosis: Abdominal Pain, Cirrhosis  Patient Status: Stable     The following MDI Criteria must be met in order to convert aerosol to MDI with spacer. If unable to meet, MDI will be converted to aerosol:  []  Patient able to demonstrate the ability to use MDI effectively  []  Patient alert and cooperative  []  Patient able to take deep breath with 5-10 second hold  []  Medication(s) available in this delivery method   []  Peak flow greater than or equal to 200 ml/min            Current Order Substituted To  (same drug, same frequency)   Aerosol to MDI [] Albuterol Sulfate 0.083% unit dose by aerosol Albuterol Sulfate MDI 2 puffs by inhalation with spacer    [] Levalbuterol 1.25 mg unit dose by aerosol Levalbuterol MDI 2 puffs by inhalation with spacer    [] Levalbuterol 0.63 mg unit dose by aerosol Levalbuterol MDI 2 puffs by inhalation with spacer    [] Ipratropium Bromide 0.02% unit dose by aerosol Ipratropium Bromide MDI 2 puffs by inhalation with spacer    [] Duoneb (Ipratropium + Albuterol) unit dose by aerosol Ipratropium MDI + Albuterol MDI 2 puffs by inhalation w/spacer   MDI to Aerosol [] Albuterol Sulfate MDI Albuterol Sulfate 0.083% unit dose by aerosol    [] Levalbuterol MDI 2 puffs by inhalation Levalbuterol 1.25 mg unit dose by aerosol    [] Ipratropium Bromide MDI by inhalation Ipratropium Bromide 0.02% unit dose by aerosol    [] Combivent (Ipratropium + Albuterol) MDI by inhalation Duoneb (Ipratropium + Albuterol) unit dose by aerosol       Treatment Assessment [Frequency/Schedule]:  Change frequency to: ____Duoneb Q4PRN____________________________________________per Protocol, P&T, MEC      Points 0 1 2 3 4   Pulmonary Status  Non-Smoker  []   Smoking history   < 20 pack years  []   Smoking history  ?  20 pack years  [x]   Pulmonary Disorder  (acute or chronic)  []   Severe or Chronic w/ Exacerbation  []     Surgical Status No [x]   Surgeries     General []   Surgery Lower []   Abdominal Thoracic or []   Upper Abdominal Thoracic with  PulmonaryDisorder  []     Chest X-ray Clear/Not  Ordered     []  Chronic Changes  Results Pending  []  Infiltrates, atelectasis, pleural effusion, or edema  [x]  Infiltrates in more than one lobe []  Infiltrate + Atelectasis, &/or pleural effusion  []    Respiratory Pattern Regular,  RR = 12-20 [x]  Increased,  RR = 21-25 []  BROWNING, irregular,  or RR = 26-30 []  Decreased FEV1  or RR = 31-35 []  Severe SOB, use  of accessory muscles, or RR ? 35  []    Mental Status Alert, oriented,  Cooperative [x]  Confused but Follows commands []  Lethargic or unable to follow commands []  Obtunded  []  Comatose  []    Breath Sounds Clear to  auscultation  []  Decreased unilaterally or  in bases only [x]  Decreased  bilaterally  []  Crackles or intermittent wheezes []  Wheezes []    Cough Strong, Spontan., & nonproductive [x]  Strong,  spontaneous, &  productive []  Weak,  Nonproductive []  Weak, productive or  with wheezes []  No spontaneous  cough or may require suctioning []    Level of Activity Ambulatory [x]  Ambulatory w/ Assist  []  Non-ambulatory []  Paraplegic []  Quadriplegic []    Total    Score:___5____     Triage Score:_____5___      Tri       Triage:     1. (>20) Freq: Q3    2. (16-20) Freq: Q4   3. (11-15) Freq: QID & Albuterol Q2 PRN    4. (6-10) Freq: TID & Albuterol Q2 PRN    5. (0-5) Freq Q4prn

## 2021-04-10 NOTE — PROGRESS NOTES
Physical Therapy Med Surg Initial Assessment  Facility/Department:  [facility] UNIT  Room: [place] [id number]-       NAME: Dusty Schilling  : 1960 (26 y.o.)  MRN: [id number]  CODE STATUS: Full Code    Date of Service: 4/10/2021    Patient Diagnosis(es): Abdominal pain [R10.9]   Chief Complaint   Patient presents with    Hepatic Disease     ascites     Patient Active Problem List    Diagnosis Date Noted    Abdominal pain 2021    Depression     Acute cerebrovascular accident (CVA) ([facility] [address] 2018        Past Medical History:   Diagnosis Date    Diabetes mellitus ([facility] [address]     Hypertension      No past surgical history on file. Chart Reviewed: Yes  Patient assessed for rehabilitation services?: Yes  Family / Caregiver Present: No    Restrictions:  Restrictions/Precautions: Fall Risk     SUBJECTIVE: Subjective: Pt oriented to self and place.  States date      Pain  Pre Treatment Pain Screening  Pain at present: 0    Post Treatment Pain Screening:        Prior Level of Function:  Social/Functional History  Lives With: Alone  Type of Home: Apartment  Home Layout: (6th floor)  Home Access: Elevator  Bathroom Shower/Tub: Tub/Shower unit  Home Equipment: [employer]  ADL Assistance: Independent  Homemaking Assistance: Independent  Homemaking Responsibilities: Yes  Ambulation Assistance: Independent  Transfer Assistance: Independent  Active : No  Patient's  Info: friends    OBJECTIVE:   Vision: Impaired  Hearing: Exceptions to [facility]  Hearing Exceptions: Hard of hearing/hearing concerns    Cognition:  Overall Orientation Status: Within Functional Limits  Follows Commands: Within Functional Limits    Observation/Palpation  Posture: Fair  Observation: cooperative with encouragement    ROM:  RLE AROM: WFL  LLE AROM : WFL    Strength:  Strength RLE  Comment: functionally >/=3+/5  Strength LLE  Comment: functionally >/=3+/5    Neuro:  Balance  Posture: Good  Sitting - Static: Good  Sitting - Dynamic: Good;- Standing - Static: Fair;+  Standing - Dynamic: Fair     Tone RLE  RLE Tone: Normotonic  Tone LLE  LLE Tone: Normotonic  Motor Control  Gross Motor?: WFL  Sensation  Overall Sensation Status: WFL    Bed mobility  Supine to Sit: Contact guard assistance  Sit to Supine: Minimal assistance  Comment: increased time and effort    Transfers  Sit to Stand: Contact guard assistance  Stand to sit: Contact guard assistance  Bed to Chair: Contact guard assistance    Ambulation  Ambulation?: Yes  Ambulation 1  Surface: level tile  Device: No Device  Assistance: Minimal assistance  Gait Deviations: Slow Thalia; Increased ARAM; Decreased step length  Distance: 30ft with turn  Comments: unsteady, 1 LOB requiring moderate assistance to maintain upright posture    Activity Tolerance  Activity Tolerance: Patient Tolerated treatment well      PT Education  PT Education: Goals;PT Role;Plan of Care    ASSESSMENT:   Body structures, Functions, Activity limitations: Decreased functional mobility ; Decreased safe awareness;Decreased balance;Decreased strength;Decreased posture  Decision Making: Medium Complexity  History: med  Exam: med  Clinical Presentation: med    Prognosis: Good  Barriers to Learning: none    DISCHARGE RECOMMENDATIONS:  Discharge Recommendations: Continue to assess pending progress    Assessment: Pt demonstrates the above deficits and decline in functional mobility status placing them at increased risk for falls. Pt would benefit from physical therapy to address above deficits and allow for safe return home at highest level of function, decrease risk for falls, and improve QOL.     REQUIRES PT FOLLOW UP: Yes      PLAN OF CARE:  Plan  Times per week: 3-6  Current Treatment Recommendations: Strengthening, Functional Mobility Training, Neuromuscular Re-education, Home Exercise Program, Equipment Evaluation, Education, & procurement, Modalities, Safety Education & Training, Gait Training, Transfer Training, Balance Training, Stair training, Patient/Caregiver Education & Training, Positioning  Safety Devices  Type of devices: Left in bed, Call light within reach, Bed alarm in place    Goals:  Patient goals : unable to state  Long term goals  Long term goal 1: Bed mobility with indep  Long term goal 2: Functional transfers with indep  Long term goal 3: Amb > 50ft with indep    AMPAC (6 CLICK) Shahram Wiley 28 Inpatient Mobility Raw Score : 16  Therapy Time:   Individual   Time In 1528   Time Out 1542   Minutes [id number] [place], [place], 04/10/21 at 3:57 PM       Definitions for assistance levels  Independent = pt does not require any physical supervision or assistance from another person for activity completion. Device may be needed.   Stand by assistance = pt requires verbal cues or instructions from another person, close to but not touching, to perform the activity  Minimal assistance= pt performs 75% or more of the activity; assistance is required to complete the activity  Moderate assistance= pt performs 50% of the activity; assistance is required to complete the activity  Maximal assistance = pt performs 25% of the activity; assistance is required to complete the activity  Dependent = pt requires total physical assistance to accomplish the task

## 2021-04-10 NOTE — CARE COORDINATION
MET WITH PATIENT, FREEDOM OF CHOICE OFFERED. PATIENT STATES AT THIS TIME PLAN IS TO RETURN. POSSIBLE HOME CARE. CM/LSW TO FOLLOW.

## 2021-04-10 NOTE — PROGRESS NOTES
Did try to call his niece to inform her on what happened. Left her a message to call. Spoke to her and there are family dynamics. She is aware and I spoke to her about talking to him about who can come visit.

## 2021-04-10 NOTE — PROGRESS NOTES
Patient was alert and oriented times three this am. Son came and visited. He was asking info about his dad and wanted to know if he could get his hippa code. I went to ask the patient in the room and he would not answer me and would not follow commands. Call a rapid. Son told me that he said he wanted his son to have the hippa code but I told him I needed to find out from him, the patient. Once the son left the room he started to answer questions appropriately. So called off the rapid response. I asked the patient if he wanted his son to have the hippa code and he shock his head no. Will let Dr. Mahsa Rushing now when he comes in.

## 2021-04-10 NOTE — PROGRESS NOTES
Department of Internal Medicine  General Internal Medicine  Attending Progress Note      SUBJECTIVE:  Pt seen and examined. Had episode of possible unresponsiveness this AM that rapid response was called for but patient awoke after son left the room. Appeared that there is a strange family dynamic that the patient does not want to see the son. Today when seen, patient still with no appetite, but denies any other complaints. Awaiting GI consultation.  Receiving calcitonin for hypercalcemia    OBJECTIVE      Medications    Current Facility-Administered Medications: calcitonin (MIACALCIN) injection 100 Units, 100 Units, Subcutaneous, Daily  0.9 % sodium chloride infusion, , Intravenous, Continuous  sodium chloride flush 0.9 % injection 5-40 mL, 5-40 mL, Intravenous, 2 times per day  sodium chloride flush 0.9 % injection 5-40 mL, 5-40 mL, Intravenous, PRN  0.9 % sodium chloride infusion, 25 mL, Intravenous, PRN  enoxaparin (LOVENOX) injection 40 mg, 40 mg, Subcutaneous, Daily  promethazine (PHENERGAN) tablet 12.5 mg, 12.5 mg, Oral, Q6H PRN **OR** ondansetron (ZOFRAN) injection 4 mg, 4 mg, Intravenous, Q6H PRN  polyethylene glycol (GLYCOLAX) packet 17 g, 17 g, Oral, Daily PRN  acetaminophen (TYLENOL) tablet 650 mg, 650 mg, Oral, Q6H PRN **OR** acetaminophen (TYLENOL) suppository 650 mg, 650 mg, Rectal, Q6H PRN  potassium chloride (KLOR-CON M) extended release tablet 40 mEq, 40 mEq, Oral, PRN **OR** potassium bicarb-citric acid (EFFER-K) effervescent tablet 40 mEq, 40 mEq, Oral, PRN **OR** potassium chloride 10 mEq/100 mL IVPB (Peripheral Line), 10 mEq, Intravenous, PRN  magnesium sulfate 2000 mg in 50 mL IVPB premix, 2,000 mg, Intravenous, PRN  pantoprazole (PROTONIX) tablet 40 mg, 40 mg, Oral, QAM AC  insulin lispro (HUMALOG) injection vial 0-6 Units, 0-6 Units, Subcutaneous, TID WC  insulin lispro (HUMALOG) injection vial 0-3 Units, 0-3 Units, Subcutaneous, Nightly  glucose (GLUTOSE) 40 % oral gel 15 g, 15 g, Oral, PRN dextrose 50 % IV solution, 12.5 g, Intravenous, PRN  glucagon (rDNA) injection 1 mg, 1 mg, Intramuscular, PRN  dextrose 5 % solution, 100 mL/hr, Intravenous, PRN  aspirin EC tablet 81 mg, 81 mg, Oral, Daily  atorvastatin (LIPITOR) tablet 40 mg, 40 mg, Oral, Nightly  clopidogrel (PLAVIX) tablet 75 mg, 75 mg, Oral, Daily  venlafaxine (EFFEXOR XR) extended release capsule 37.5 mg, 37.5 mg, Oral, Daily with breakfast  hydrALAZINE (APRESOLINE) injection 10 mg, 10 mg, Intravenous, Q6H PRN  Physical    VITALS:  BP (!) 162/99   Pulse 97   Temp 97.3 °F (36.3 °C)   Resp 18   Ht 5' 9\" (1.753 m)   Wt 240 lb (108.9 kg)   SpO2 91%   BMI 35.44 kg/m²   Constitutional: Awake and alert in no acute distress.  Lying in bed comfortably  Head: Normocephalic, atraumatic  Eyes: EOMI, PERRLA  ENT: moist mucous membranes  Neck: neck supple, trachea midline  Lungs: Good inspiratory effort, CTABL, no wheeze, no rhonchi, no rales  Heart: RRR, normal S1 and S2  GI: Soft, distended, non tender, no guarding, no rebound, +BS  MSK: no edema noted  Skin: warm, dry  Psych: strange affect  Data    CBC:   Lab Results   Component Value Date    WBC 5.6 04/10/2021    RBC 4.60 04/10/2021    HGB 13.2 04/10/2021    HCT 38.8 04/10/2021    MCV 84.5 04/10/2021    MCH 28.8 04/10/2021    MCHC 34.0 04/10/2021    RDW 16.5 04/10/2021    PLT 94 04/10/2021    MPV 10.6 09/10/2013     CMP:    Lab Results   Component Value Date     04/10/2021    K 3.4 04/10/2021     04/10/2021    CO2 22 04/10/2021    BUN 32 04/10/2021    CREATININE 1.53 04/10/2021    GFRAA 56.1 04/10/2021    LABGLOM 46.4 04/10/2021    GLUCOSE 82 04/10/2021    PROT 7.0 04/09/2021    LABALBU 3.0 04/09/2021    CALCIUM 14.0 04/10/2021    BILITOT 1.7 04/09/2021    ALKPHOS 142 04/09/2021    AST 74 04/09/2021    ALT 25 04/09/2021       ASSESSMENT AND PLAN      # New onset cirrhosis with ascites and associated lethargy/weakness  - malignancy is high on differential  - CT with nodularity of liver consistent with cirrhosis and large ascites  - abd soft, but distended. IR consulted for possible paracentesis  - US RUQ shows likely cirrhosis and middle, left and right portal vein thrombosis  - will consult oncology in setting of hypercalcemia, thrombocytopenia, portal vein thrombosis. Will need malignancy workup  - hepatitis panel negative  - GI consulted  - PT/OT     # DELILAH with hypercalcemia  - last labs in 2018 were normal  - today creatinine 1.53  - nephrology consulted- calcitonin today  - may be part of underlying possibly malignancy  - albumin low and anasarca noted on CT    # Thrombocytopenia  - in setting of cirrhosis  - monitor for signs of bleeding  - will defer full anticoagulation to oncology      # DM2  - ISS     # Hx of CVA  - cont DAPT, statin     DVT: lovenox ppx in setting of thrombocytopenia     Disposition: Pt with increasing lethargy/weakness and noted to have new cirrhosis on CT and concern for underlying malignancy. GI and oncology consulted to help with further workup. PT/OT.       Simone Ding,   Internal Medicine

## 2021-04-10 NOTE — CONSULTS
 Marital status:      Spouse name: Not on file    Number of children: Not on file    Years of education: Not on file    Highest education level: Not on file   Occupational History    Not on file   Social Needs    Financial resource strain: Not on file    Food insecurity     Worry: Not on file     Inability: Not on file    Transportation needs     Medical: Not on file     Non-medical: Not on file   Tobacco Use    Smoking status: Current Every Day Smoker     Packs/day: 1.00     Types: E-Cigarettes    Smokeless tobacco: Never Used   Substance and Sexual Activity    Alcohol use: Yes     Comment: occassionally     Drug use: Yes     Types: Marijuana    Sexual activity: Not on file   Lifestyle    Physical activity     Days per week: Not on file     Minutes per session: Not on file    Stress: Not on file   Relationships    Social connections     Talks on phone: Not on file     Gets together: Not on file     Attends Advent service: Not on file     Active member of club or organization: Not on file     Attends meetings of clubs or organizations: Not on file     Relationship status: Not on file    Intimate partner violence     Fear of current or ex partner: Not on file     Emotionally abused: Not on file     Physically abused: Not on file     Forced sexual activity: Not on file   Other Topics Concern    Not on file   Social History Narrative    Not on file      [] Unable to obtain due to ventilated and/ or neurologic status    Home Medications:      Medications Prior to Admission: albuterol sulfate HFA (VENTOLIN HFA) 108 (90 Base) MCG/ACT inhaler, Inhale 2 puffs into the lungs every 6 hours as needed for Shortness of Breath  aspirin 81 MG EC tablet, Take 1 tablet by mouth daily  atorvastatin (LIPITOR) 40 MG tablet, Take 1 tablet by mouth nightly  clopidogrel (PLAVIX) 75 MG tablet, Take 1 tablet by mouth daily  venlafaxine (EFFEXOR XR) 37.5 MG extended release capsule, Take 1 capsule by mouth daily (with breakfast)  glyBURIDE (DIABETA) 5 MG tablet, Take 10 mg by mouth 2 times daily (with meals)  lisinopril (PRINIVIL;ZESTRIL) 20 MG tablet, Take 20 mg by mouth daily  metFORMIN (GLUCOPHAGE) 1000 MG tablet, Take 1,000 mg by mouth 2 times daily (with meals)    Current Hospital Medications:   Scheduled Meds:   calcitonin  100 Units Subcutaneous Daily    sodium chloride flush  5-40 mL Intravenous 2 times per day    enoxaparin  40 mg Subcutaneous Daily    pantoprazole  40 mg Oral QAM AC    insulin lispro  0-6 Units Subcutaneous TID WC    insulin lispro  0-3 Units Subcutaneous Nightly    aspirin  81 mg Oral Daily    atorvastatin  40 mg Oral Nightly    clopidogrel  75 mg Oral Daily    venlafaxine  37.5 mg Oral Daily with breakfast     Continuous Infusions:   sodium chloride 100 mL/hr at 04/10/21 0650    sodium chloride      dextrose       PRN Meds:.sodium chloride flush, sodium chloride, promethazine **OR** ondansetron, polyethylene glycol, acetaminophen **OR** acetaminophen, potassium chloride **OR** potassium alternative oral replacement **OR** potassium chloride, magnesium sulfate, glucose, dextrose, glucagon (rDNA), dextrose, hydrALAZINE   sodium chloride 100 mL/hr at 04/10/21 0650    sodium chloride      dextrose        Allergies: Allergies   Allergen Reactions    Codeine      Pt states he is not allergic       Review of Systems:       [x] CV, Resp, Neuro, , and all other systems reviewed and negative other than listed in HPI.     Objective Findings:     Vitals:   Vitals:    04/09/21 1600 04/09/21 1930 04/10/21 0740 04/10/21 1046   BP: (!) 151/89 (!) 141/91 128/74 (!) 162/99   Pulse: 107 108 106 97   Resp: 16 18     Temp: 97.3 °F (36.3 °C) 97.2 °F (36.2 °C) 97.3 °F (36.3 °C)    TempSrc: Oral Oral     SpO2: 98% 98% 96% 91%   Weight:       Height:          Physical Examination:  General: Alert and oriented, in minimal distress secondary to abdominal distention, ill-appearing  HEENT: B Core Ab, IgM       Non-reactive   Hep B S Ag Interp       Non-reactive   Hep C Ab Interp       Non-reactive   Hepatitis Interpretation:       see below     Component      Latest Ref Rng & Units 4/9/2021          12:15 PM   Ammonia      16 - 60 umol/L 45     Component      Latest Ref Rng & Units 4/10/2021           9:19 AM   Vit D, 25-Hydroxy      30.0 - 100.0 ng/mL 11.5 (L)     Ct Abdomen Pelvis Wo Contrast Additional Contrast? None  Result Date: 4/9/2021  EXAMINATION:  CT ABDOMEN PELVIS WO CONTRAST HISTORY:   Ascites, new onset weakness, fall, abdominal pain. TECHNIQUE: Non-IV contrast imaging of the abdomen and pelvis was performed using standard technique, scanning from just above the dome of the diaphragm to the symphysis pubis. Including coronal and sagittal reconstructions. Unenhanced imaging is limited for the evaluation of some intra-abdominal and pelvic pathology. Contrast: IV: None Oral:  None. All CT scans at this facility use dose modulation, iterative reconstruction, and/or weight based dosing when appropriate to reduce radiation dose to as low as reasonably achievable. COMPARISON: None. RESULT: Abdomen / Pelvis: Liver: Cirrhotic liver morphology. Heterogeneous appearance of the liver. Assessment for hepatic lesions limited without contrast. Apparent low-attenuation lesions within the liver. Biliary: Extensive cholelithiasis. No distinct gallbladder wall thickening. Gallbladder mildly distended. Pancreas: Unremarkable. Spleen: Splenomegaly measuring around 15.0 cm. Adrenals: No mass. Kidneys: 3 mm calculus within the right lower pole. Punctate interpolar right-sided calculus. Other probable vascular calcifications. No hydronephrosis. No suspicious renal lesion within the unenhanced kidneys. GI Tract: Feces throughout the colon, especially within the rectal region. Diverticulosis, without evidence for diverticulitis. No small bowel dilation.  Lymph Nodes: Multiple enlarged bulky mesenteric lymph nodes, with large conglomerate at the level of the mesenteric root. For example in the mid upper abdomen lymph node measuring approximately 3.7 x 1.9 cm (series 2 image 27). Enlarged retroperitoneal lymph nodes. For example para-aortic lymph node measuring 2.9 x 2.3 cm (series 2 image 44). Mesentery/peritoneum/retroperitoneum: Large volume ascites, especially involving the perihepatic and perisplenic regions, paracolic gutters, and tracking to the pelvis. No loculated collection. Vasculature: Mild arterial atherosclerotic disease without aneurysm. Apparent mesenteric collateral vessels. Pelvis: Ascites tracks into the pelvis. No bladder wall thickening. Small fat-containing periumbilical hernia. Bones: No acute osseous findings. No destructive osseous lesions. Osteopenia. Bridging endplate osteophytes within the spine. Soft tissues: Anasarca. Lower thorax: Small bilateral pleural effusions. Coronary stent/calcifications. Cirrhotic liver morphology. Evaluation for liver lesions limited without contrast, apparent indeterminant low-attenuation lesions within the liver. Large volume ascites. Small pleural effusions. Anasarca. Mesenteric and retroperitoneal lymphadenopathy, nonspecific. Differential includes reactive process, infectious/inflammatory process, lymphoproliferative disorder (such as lymphoma), neoplastic process, etc. Feces throughout the colon, especially within the rectal region, suggestive of constipation, better assessed clinically. Right nephrolithiasis. Ct Head Wo Contrast  Result Date: 4/9/2021  EXAMINATION:  CT HEAD WO CONTRAST HISTORY:  Fall. Confusion. TECHNIQUE:  Serial axial images without IV contrast were obtained from the vertex to the foramen magnum. Sagittal and coronal reconstructions. All CT scans at this facility use dose modulation, iterative reconstruction, and/or weight based dosing when appropriate to reduce radiation dose to as low as reasonably achievable.  COMPARISON:  CT 7/29/2018 RESULT: Acute change:   No evidence of an acute infarct or other acute parenchymal process. Hemorrhage:    No evidence of acute intracranial hemorrhage. Mass Lesion / Mass Effect:   There is no evidence of an intracranial mass or extraaxial fluid collection. No significant mass effect. Chronic change:   Scattered patchy foci of low attenuation are present within supratentorial white matter which is a nonspecific finding but likely represents mild microvascular ischemia. Parenchyma: There is mild to moderate generalized volume loss. Encephalomalacia adjacent to the right caudate, unchanged. Ventricles:   Ventricular enlargement concordant with the degree of parenchymal volume loss. Paranasal sinuses and skull base:  Mild mucosal thickening within the ethmoid air cells and maxillary sinuses. Fluid within the right mastoid air cells. The skull base is unremarkable. Soft tissues unremarkable. No acute intracranial process. Xr Chest Portable  Result Date: 4/9/2021  EXAMINATION: CHEST PORTABLE VIEW  CLINICAL HISTORY: Weakness COMPARISONS: None  FINDINGS: Single  views of the chest is submitted. The cardiac silhouette is of normal size configuration. Pulmonary vascular unremarkable. Right sided trachea. Bibasilar areas atelectasis, patchy infiltrates in the bases. Trace/ small right pleural effusion  No Pneumothoraces. BIBASILAR AREAS ATELECTASIS, PATCHY INFILTRATES IN THE BASES. TRACE/SMALL RIGHT PLEURAL EFFUSION    Us Abdomen Limited Specify Organ? Liver, Gallbladder, Pancreas  Result Date: 4/9/2021  EXAMINATION:  US ABDOMEN LIMITED CLINICAL HISTORY:  cirrhosis, abd pain . COMPARISONS:  NONE AVAILABLE TECHNIQUE:  Transabdominal ultrasound the rectal quadrant. FINDINGS:  The liver shows a a nodularity to the margin. This a nonspecific finding can be seen with diffuse infiltrative process or cirrhosis.  There is a coarse heterogeneous echotexture to the parenchyma. No discrete masses are identified. No intrahepatic biliary  dilatation. The gallbladder shows no surrounding pericholecystic fluid. The wall is mildly thickened measuring 4 mm. There is hyperechoic foci with posterior acoustic shadowing likely represent a combination of cholelithiasis and gallbladder sludge. Common bile duct  measures 3 mm. There is diffuse ascites present. 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 1. THE LIVER IS NODULAR. THIS A NONSPECIFIC AND CAN BE SEEN WITH BENIGN DIFFUSELY INFILTRATIVE PROCESSES AND/OR CIRRHOSIS. . 2. THERE IS A DIFFUSE HETEROGENEOUS ECHOTEXTURE TO THE LIVER. NO DEFINITIVE NODULE IS NOTED. HOWEVER ULTRASOUND IS LIMITED IN ITS ABILITY TO DIFFERENTIATE SOFT TISSUE OF SIMILAR ECHOGENICITY. CLINICALLY INDICATED COULD CONSIDER MRI TO FURTHER EVALUATE. 3. GALLBLADDER SLUDGE AND STONES. 4 THERE ARE FINDINGS OF THROMBUS OF THE MIDDLE, LEFT AND RIGHT PORTAL VEINS     Impression:   63 y.o. male with new diagnosis of cirrhosis based on imaging and labs. Significant ascites. Clinical examination shows evidence for chronic liver disease in the form of multiple spider nevi, ascites, muscle wasting. No evidence for hepatic encephalopathy. Patient is a poor historian. No obvious risk factors identified for cirrhosis i.e. alcohol use alcohol or history of intravenous drug use. Given history of diabetes and obesity, suspect nonalcoholic steatohepatitis. Given significant weight loss, need to exclude neoplasia. Review of weight chart appears to show BMI of 41.3 (272 pounds) in April 2018  Plan:   Cirrhosis  Cause of liver disease: Likely LUNA  - Ascites: Proceed with diagnostic and therapeutic large volume paracentesis, recommend checking cytology on the fluid to evaluate for presence of neoplasia. Unable to start on diuretics given elevated creatinine and BUN. Limit sodium to < 2gm /day.   - EGD for Variceal screening on outpatient basis. - Check AFP  - Advised to limit NSAID use     Comments: Thank you for allowing us to participate in the care of this patient. Will continue to follow. Please call if questions or concerns arise. Electronically signed by Dona Beal MD on 4/10/2021 at 12:36 PM    Please note this report has been partially produced using speech recognition software and may cause contain errors related to that system including grammar, punctuation and spelling as well as words and phrases that may seem inappropriate. If there are questions or concerns please feel free to contact me to clarify.

## 2021-04-11 NOTE — PROGRESS NOTES
Renal Progress Note    Assessment and Plan:     78-year-old man with diabetes mellitus and hypertension not taking any medicines nor having any labs done over the last few years. Has a new hypercalcemia with a calcium level of 14, acute renal failure, hypokalemia with cirrhotic appearing liver as well as ascites. He says about 60 pound weight loss. jpartly on purpose with keto diet but the  combination of the hypercalcemia and significant weight loss is concerning for underlying malignancy.     Plan/  1-hydrating 100 cc an hour for hypercalcemia  2-gave calcitonin for just 2 doses  3- give Zometa 4 mg today  4-check PTH, PTH related peptide, vitamin D levels, phosphorus levels as well as SPEP  5-plan is for paracentesis. We will follow up on cytology of this  6- replete k    Patient Active Problem List:     Acute cerebrovascular accident (CVA) ([address]     Depression     Abdominal pain      Subjective:   Admit Date: 4/9/2021    Interval History: kidney function better. No cp. No sob. Calcium better but high.        Medications:   Scheduled Meds:   sodium chloride flush  5-40 mL Intravenous 2 times per day    enoxaparin  40 mg Subcutaneous Daily    pantoprazole  40 mg Oral QAM AC    insulin lispro  0-6 Units Subcutaneous TID WC    insulin lispro  0-3 Units Subcutaneous Nightly    aspirin  81 mg Oral Daily    atorvastatin  40 mg Oral Nightly    clopidogrel  75 mg Oral Daily    venlafaxine  37.5 mg Oral Daily with breakfast     Continuous Infusions:   sodium chloride 100 mL/hr at 04/11/21 1156    sodium chloride      dextrose         CBC:   Recent Labs     04/10/21  0602 04/11/21  0730   WBC 5.6 5.4   HGB 13.2* 12.6*   PLT 94* 95*     CMP:    Recent Labs     04/09/21  1215 04/09/21  1228 04/10/21  0602 04/11/21  0730     --  134* 137   K 3.4  --  3.4 3.3*   CL 97  --  101 104   CO2 24  --  22 21   BUN 32*  --  32* 33*   CREATININE 1.78* 1.9* 1.53* 1.30*   GLUCOSE 103*  --  82 73   CALCIUM 14.6*  -- 14.0* 12.3*   LABGLOM 39.0* 36* 46.4* 56.0*     Troponin:   Recent Labs     04/09/21  1215   TROPONINI 0.016*     BNP: No results for input(s): BNP in the last 72 hours. INR: No results for input(s): INR in the last 72 hours. Lipids:   Recent Labs     04/09/21  1215   LIPASE 28     Liver:   Recent Labs     04/09/21  1215   AST 74*   ALT 25   ALKPHOS 142*   PROT 7.0   LABALBU 3.0*   BILITOT 1.7*     Iron:  No results for input(s): IRONS, FERRITIN in the last 72 hours. Invalid input(s): LABIRONS  Urinalysis: No results for input(s): UA in the last 72 hours. Objective:   Vitals: /84   Pulse 94   Temp 98.1 °F (36.7 °C) (Oral)   Resp 18   Ht 5' 9\" (1.753 m)   Wt 240 lb (108.9 kg)   SpO2 95%   BMI 35.44 kg/m²    Wt Readings from Last 3 Encounters:   04/09/21 240 lb (108.9 kg)   07/29/18 272 lb (123.4 kg)   04/25/18 278 lb (126.1 kg)      24HR INTAKE/OUTPUT:      Intake/Output Summary (Last 24 hours) at 4/11/2021 1256  Last data filed at 4/11/2021 0919  Gross per 24 hour   Intake 3126 ml   Output 975 ml   Net 2151 ml       Constitutional:  Alert, awake, no apparent distress   Skin:normal, no rash  HEENT:sclera anicteric. Head atraumatic normocephalic  Neck:supple with no thyromegally  Cardiovascular:  S1, S2 without m/r/g   Respiratory:  CTA B without w/r/r   Abdomen: +bs, soft, nt.   Distended with fluid wave  Ext: no LE edema  Musculoskeletal:Intact  Neuro:Alert and oriented with no deficit      Electronically signed by Martin Fletcher MD on 4/11/2021 at 12:56 PM

## 2021-04-11 NOTE — PROGRESS NOTES
Arousal/Alertness: Delayed responses to stimuli  Following Commands: Inconsistently follows commands  Attention Span: Difficulty attending to directions, Difficulty dividing attention  Memory: Decreased recall of precautions, Decreased short term memory, Decreased long term memory  Safety Judgement: Decreased awareness of need for assistance, Decreased awareness of need for safety  Problem Solving: Assistance required to generate solutions, Assistance required to correct errors made, Assistance required to identify errors made, Assistance required to implement solutions  Insights: Decreased awareness of deficits  Initiation: Requires cues for some  Sequencing: Requires cues for some  Cognition Comment: Decreased safety and sequencing, verbal cues to initiate    Perception Status:  Perception  Overall Perceptual Status: WFL    Sensation Status:  Sensation  Overall Sensation Status: [facility]    Vision and Hearing Status:  Vision  Vision: Impaired  Vision Exceptions: Wears glasses for reading  Hearing  Hearing: Exceptions to [facility]  Hearing Exceptions: Hard of hearing/hearing concerns     ROM:   LUE AROM (degrees)  LUE AROM : WFL  Left Hand AROM (degrees)  Left Hand AROM: WFL  RUE AROM (degrees)  RUE AROM : WFL  Right Hand AROM (degrees)  Right Hand AROM: WFL    Strength:  LUE Strength  Gross LUE Strength: Exceptions to [facility]  L Hand General: 3+/5  LUE Strength Comment: 3+/5 all planes  RUE Strength  Gross RUE Strength: Exceptions to [facility]  R Hand General: 3+/5  RUE Strength Comment: 3+/5 all planes    Coordination, Tone, Quality of Movement: Tone RUE  RUE Tone: Normotonic  Tone LUE  LUE Tone: Normotonic  Coordination  Movements Are Fluid And Coordinated: No  Coordination and Movement description: Fine motor impairments, Decreased speed, Decreased accuracy, Right UE, Left UE    Hand Dominance:  Hand Dominance  Hand Dominance: Right    ADL Status:  ADL  Feeding: Setup  Grooming: Minimal assistance  UE Bathing:  Moderate assistance cognition    OT Follow Up:  OT D/C RECOMMENDATIONS  REQUIRES OT FOLLOW UP: Yes       Assessment/Discharge Disposition:  Assessment: Pt is a 63year old man from home alone who presents to [facility] with the above deficits which impact his ability to perform ADLs and IADLs. Pt. limited d/t fatigue, weakness and decreased sequencing and safety. Pt. would benefit from continued OT to maximize independence and safety with ADL tasks.   Performance deficits / Impairments: Decreased functional mobility , Decreased ADL status, Decreased strength, Decreased endurance, Decreased balance, Decreased high-level IADLs, Decreased fine motor control, Decreased coordination, Decreased cognition, Decreased safe awareness  Prognosis: Good  Discharge Recommendations: Continue to assess pending progress  Decision Making: Medium Complexity  History: Pt's medical history is moderately complex  Exam: Pt. has 10 performance deficits  Assistance / Modification: Pt. requires mod A    Six Click Score   How much help for putting on and taking off regular lower body clothing?: A Lot  How much help for Bathing?: A Lot  How much help for Toileting?: A Lot  How much help for putting on and taking off regular upper body clothing?: A Lot  How much help for taking care of personal grooming?: A Little  How much help for eating meals?: A Little  AM-[facility] Inpatient Daily Activity Raw Score: 14  AM-PAC Inpatient ADL T-Scale Score : 33.39  ADL Inpatient CMS 0-100% Score: 59.67    Plan:  Plan  Times per week: 1-3x  Plan weeks: Length of acute stay  Current Treatment Recommendations: Strengthening, Balance Training, Functional Mobility Training, Endurance Training, Pain Management, Safety Education & Training, Patient/Caregiver Education & Training, Equipment Evaluation, Education, & procurement, Self-Care / ADL, Home Management Training, Cognitive/Perceptual Training    Goals:   Patient will:    - Improve functional endurance to tolerate/complete 30 mins of ADL's - Be Setup in UB ADLs   - Be Min A in LB ADLs  - Be SBA in ADL transfers without LOB  - Be SBA in toileting tasks  - Improve B hand fine motor coordination to [facility] in order to manage clothing fasteners/self-care containers in a timely manner  - Improve B UE strength and endurance to 4-/5 in order to participate in self-care activities as projected. - Access appropriate D/C site with as few architectural barriers as possible. - Sequence self-care tasks with no verbal cues for safety    Patient Goal: Patient goals : \"Get strong and go back home\"     Discussed and agreed upon: Yes Comments:     Therapy Time:   OT Individual Minutes  Time In: 9051  Time Out: 1502  Minutes: 15    Eval: 15 minutes     Electronically signed by:     HUNTER Wilson  4/11/2021, 3:21 PM

## 2021-04-11 NOTE — PROGRESS NOTES
Department of Internal Medicine  General Internal Medicine  Attending Progress Note      SUBJECTIVE:  Pt seen and examined. No new complaints.   No nausea, vomiting, chest pain, or headache    OBJECTIVE      Medications    Current Facility-Administered Medications: zoledronic acid (ZOMETA) 4 mg in sodium chloride 0.9 % 100 mL IVPB, 4 mg, Intravenous, Once  ipratropium-albuterol (DUONEB) nebulizer solution 1 ampule, 1 ampule, Inhalation, Q4H PRN  sodium chloride (OCEAN, BABY AYR) 0.65 % nasal spray 1 spray, 1 spray, Each Nostril, PRN  0.9 % sodium chloride infusion, , Intravenous, Continuous  sodium chloride flush 0.9 % injection 5-40 mL, 5-40 mL, Intravenous, 2 times per day  sodium chloride flush 0.9 % injection 5-40 mL, 5-40 mL, Intravenous, PRN  0.9 % sodium chloride infusion, 25 mL, Intravenous, PRN  enoxaparin (LOVENOX) injection 40 mg, 40 mg, Subcutaneous, Daily  promethazine (PHENERGAN) tablet 12.5 mg, 12.5 mg, Oral, Q6H PRN **OR** ondansetron (ZOFRAN) injection 4 mg, 4 mg, Intravenous, Q6H PRN  polyethylene glycol (GLYCOLAX) packet 17 g, 17 g, Oral, Daily PRN  acetaminophen (TYLENOL) tablet 650 mg, 650 mg, Oral, Q6H PRN **OR** acetaminophen (TYLENOL) suppository 650 mg, 650 mg, Rectal, Q6H PRN  potassium chloride (KLOR-CON M) extended release tablet 40 mEq, 40 mEq, Oral, PRN **OR** potassium bicarb-citric acid (EFFER-K) effervescent tablet 40 mEq, 40 mEq, Oral, PRN **OR** potassium chloride 10 mEq/100 mL IVPB (Peripheral Line), 10 mEq, Intravenous, PRN  magnesium sulfate 2000 mg in 50 mL IVPB premix, 2,000 mg, Intravenous, PRN  pantoprazole (PROTONIX) tablet 40 mg, 40 mg, Oral, QAM AC  insulin lispro (HUMALOG) injection vial 0-6 Units, 0-6 Units, Subcutaneous, TID WC  insulin lispro (HUMALOG) injection vial 0-3 Units, 0-3 Units, Subcutaneous, Nightly  glucose (GLUTOSE) 40 % oral gel 15 g, 15 g, Oral, PRN  dextrose 50 % IV solution, 12.5 g, Intravenous, PRN  glucagon (rDNA) injection 1 mg, 1 mg, Intramuscular, PRN  dextrose 5 % solution, 100 mL/hr, Intravenous, PRN  aspirin EC tablet 81 mg, 81 mg, Oral, Daily  atorvastatin (LIPITOR) tablet 40 mg, 40 mg, Oral, Nightly  clopidogrel (PLAVIX) tablet 75 mg, 75 mg, Oral, Daily  venlafaxine (EFFEXOR XR) extended release capsule 37.5 mg, 37.5 mg, Oral, Daily with breakfast  hydrALAZINE (APRESOLINE) injection 10 mg, 10 mg, Intravenous, Q6H PRN  Physical    VITALS:  /84   Pulse 94   Temp 98.1 °F (36.7 °C) (Oral)   Resp 18   Ht 5' 9\" (1.753 m)   Wt 240 lb (108.9 kg)   SpO2 95%   BMI 35.44 kg/m²   Constitutional: Awake and alert in no acute distress. Lying in bed comfortably  Head: Normocephalic, atraumatic  Eyes: EOMI, PERRLA  ENT: moist mucous membranes  Neck: neck supple, trachea midline  Lungs: Good inspiratory effort, CTABL, no wheeze, no rhonchi, no rales  Heart: RRR, normal S1 and S2  GI: Soft, distended, non tender, no guarding, no rebound, +BS  MSK: no edema noted  Skin: warm, dry  Psych: strange affect  Data    CBC:   Lab Results   Component Value Date    WBC 5.4 04/11/2021    RBC 4.31 04/11/2021    HGB 12.6 04/11/2021    HCT 36.8 04/11/2021    MCV 85.5 04/11/2021    MCH 29.2 04/11/2021    MCHC 34.1 04/11/2021    RDW 16.5 04/11/2021    PLT 95 04/11/2021    MPV 10.6 09/10/2013     CMP:    Lab Results   Component Value Date     04/11/2021    K 3.3 04/11/2021     04/11/2021    CO2 21 04/11/2021    BUN 33 04/11/2021    CREATININE 1.30 04/11/2021    GFRAA >60.0 04/11/2021    LABGLOM 56.0 04/11/2021    GLUCOSE 73 04/11/2021    PROT 7.0 04/09/2021    LABALBU 3.0 04/09/2021    CALCIUM 12.3 04/11/2021    BILITOT 1.7 04/09/2021    ALKPHOS 142 04/09/2021    AST 74 04/09/2021    ALT 25 04/09/2021       ASSESSMENT AND PLAN      # New onset cirrhosis with ascites and associated lethargy/weakness  - malignancy is high on differential  - CT with nodularity of liver consistent with cirrhosis and large ascites  - abd soft, but distended.  IR consulted

## 2021-04-11 NOTE — PROGRESS NOTES
Patient assessed and charted on by this RN. Patient is alert and oriented times 2-3 at times. He has no shortness of breath and no chest pain. He does not have a good appetite. His abdominal area is distended but he has no shortness of breath and no chest pain at this time. He has swelling in his legs and has had bms today. Patient does get up with assistance to go to the restroom. He is weak but able to ambulate. Did speak to Yuridia Saravanan his niece about an update. No new complaints at this time.

## 2021-04-11 NOTE — CONSULTS
Hematology/Oncology Consult  Encounter Date: 2021 2:38 PM    Mr. Irma Groves is a 63 y.o. male  : 1960  MRN: [id number]  Acct Number: [de-identified]  Requesting Provider: Edmund Lai MD    Reason for request: Portal vein Thrombosis and Thrombocytopenia      CONSULTANT: Joelle Patrick    HPI: Mr. Floyd Hudson is a 63years old  male who is being seen in consultation at the request of Dr. Chiquis Peterson regarding further management of Portal vein thrombosis and Thrombocytopenia . Patient has a history of Adult onset Diabetes mellitus, Hypertension and CVA in 2018. He was admitted with increasing abdominal girth, generalized weakness , decreasing mobility and a history of fall . CT scan of abdomen showed Cirrhosis of liver , Ascites portal vein thrombosis, mesenteric and retroperitoneal lymphadenopathy and portal vein thrombosis . Patient is a poor historian. He states that He used to drink Ethanol when He was young and quit drinking alcohol 10 years ago . CBC showed Normocytic anemia and Thrombocytopenia . Chem profile showed elevated LFT's and mild hypokalemia. Patient Active Problem List   Diagnosis    [facility]A) [facility] [address]    Depression    Abdominal pain     Past Medical History:   Diagnosis Date    Diabetes mellitus ([facility] [address]     Hypertension      @PSH@  No family history on file.   Social History     Socioeconomic History    Marital status:      Spouse name: Not on file    Number of children: Not on file    Years of education: Not on file    Highest education level: Not on file   Occupational History    Not on file   Social Needs    Financial resource strain: Not on file    Food insecurity     Worry: Not on file     Inability: Not on file    Transportation needs     Medical: Not on file     Non-medical: Not on file   Tobacco Use    Smoking status: Current Every Day Smoker     Packs/day: 1.00     Types: E-Cigarettes    Smokeless tobacco: Never Used Substance and Sexual Activity    Alcohol use: Yes     Comment: occassionally     Drug use: Yes     Types: Marijuana    Sexual activity: Not on file   Lifestyle    Physical activity     Days per week: Not on file     Minutes per session: Not on file    Stress: Not on file   Relationships    Social connections     Talks on phone: Not on file     Gets together: Not on file     Attends Quaker service: Not on file     Active member of club or organization: Not on file     Attends meetings of clubs or organizations: Not on file     Relationship status: Not on file    Intimate partner violence     Fear of current or ex partner: Not on file     Emotionally abused: Not on file     Physically abused: Not on file     Forced sexual activity: Not on file   Other Topics Concern    Not on file   Social History Narrative    Not on file            Current Facility-Administered Medications   Medication Dose Route Frequency Provider Last Rate Last Admin    zoledronic acid (ZOMETA) 4 mg in sodium chloride 0.9 % 100 mL IVPB  4 mg Intravenous Once Tevin Mcnair MD        ipratropium-albuterol (DUONEB) nebulizer solution 1 ampule  1 ampule Inhalation Q4H PRN Jud Lee DO        sodium chloride (OCEAN, BABY AYR) 0.65 % nasal spray 1 spray  1 spray Each Nostril PRN Harmeet Moss MD   1 spray at 04/10/21 2107    0.9 % sodium chloride infusion   Intravenous Continuous Laron Gross  mL/hr at 04/11/21 1156 New Bag at 04/11/21 1156    sodium chloride flush 0.9 % injection 5-40 mL  5-40 mL Intravenous 2 times per day Jud Lee DO   10 mL at 04/10/21 2108    sodium chloride flush 0.9 % injection 5-40 mL  5-40 mL Intravenous PRN Jud Lee DO        0.9 % sodium chloride infusion  25 mL Intravenous PRN Jud Lee DO        enoxaparin (LOVENOX) injection 40 mg  40 mg Subcutaneous Daily Jud Lee DO   40 mg at 04/11/21 0824    promethazine (PHENERGAN) tablet 12.5 mg  12.5 mg Oral Q6H PRN Altamese Leak, DO        Or    ondansetron [facility]) injection 4 mg  4 mg Intravenous Q6H PRN Altamese Leak, DO        polyethylene glycol (GLYCOLAX) packet 17 g  17 g Oral Daily PRN Altamese Leak, DO        acetaminophen (TYLENOL) tablet 650 mg  650 mg Oral Q6H PRN Altamese Leak, DO        Or    acetaminophen (TYLENOL) suppository 650 mg  650 mg Rectal Q6H PRN Altamese Leak, DO        potassium chloride (KLOR-CON M) extended release tablet 40 mEq  40 mEq Oral PRN Altamese Leak, DO        Or    potassium bicarb-citric acid (EFFER-K) effervescent tablet 40 mEq  40 mEq Oral PRN Altamese Leak, DO        Or    potassium chloride 10 mEq/100 mL IVPB (Peripheral Line)  10 mEq Intravenous PRN Altamese Leak, DO        magnesium sulfate 2000 mg in 50 mL IVPB premix  2,000 mg Intravenous PRN Altamese Leak, DO        pantoprazole (PROTONIX) tablet 40 mg  40 mg Oral QAM AC Altamese Leak, DO   40 mg at 04/11/21 0606    insulin lispro (HUMALOG) injection vial 0-6 Units  0-6 Units Subcutaneous TID WC Altamese Leak, DO        insulin lispro (HUMALOG) injection vial 0-3 Units  0-3 Units Subcutaneous Nightly Altamese Leak, DO   Stopped at 04/10/21 2108    glucose (GLUTOSE) 40 % oral gel 15 g  15 g Oral PRN Altamese Leak, DO        dextrose 50 % IV solution  12.5 g Intravenous PRN Altamese Leak, DO        glucagon (rDNA) injection 1 mg  1 mg Intramuscular PRN Altamese Leak, DO        dextrose 5 % solution  100 mL/hr Intravenous PRN Altamese Leak, DO        aspirin EC tablet 81 mg  81 mg Oral Daily Altamese Leak, DO   81 mg at 04/11/21 0824    atorvastatin (LIPITOR) tablet 40 mg  40 mg Oral Nightly Altamese Leak, DO   40 mg at 04/10/21 2107    clopidogrel (PLAVIX) tablet 75 mg  75 mg Oral Daily Altamese Leak, DO   75 mg at 04/11/21 0824    venlafaxine (EFFEXOR XR) extended release capsule 37.5 mg  37.5 mg Oral Daily with breakfast Altamese Leak, DO   37.5 mg at 04/11/21 0824    hydrALAZINE and perisplenic regions, paracolic gutters, and tracking to the pelvis. No loculated collection. Vasculature: Mild arterial atherosclerotic disease without aneurysm. Apparent mesenteric collateral vessels. Pelvis: Ascites tracks into the pelvis. No bladder wall thickening. Small fat-containing periumbilical hernia. Bones: No acute osseous findings. No destructive osseous lesions. Osteopenia. Bridging endplate osteophytes within the spine. Soft tissues: Anasarca. Lower thorax: Small bilateral pleural effusions. Coronary stent/calcifications. Cirrhotic liver morphology. Evaluation for liver lesions limited without contrast, apparent indeterminant low-attenuation lesions within the liver. Large volume ascites. Small pleural effusions. Anasarca. Mesenteric and retroperitoneal lymphadenopathy, nonspecific. Differential includes reactive process, infectious/inflammatory process, lymphoproliferative disorder (such as lymphoma), neoplastic process, etc. Feces throughout the colon, especially within the rectal region, suggestive of constipation, better assessed clinically. Right nephrolithiasis. ==========     Ct Head Wo Contrast    Result Date: 4/9/2021  EXAMINATION:  CT HEAD WO CONTRAST HISTORY:  Fall. Confusion. TECHNIQUE:  Serial axial images without IV contrast were obtained from the vertex to the foramen magnum. Sagittal and coronal reconstructions. All CT scans at this facility use dose modulation, iterative reconstruction, and/or weight based dosing when appropriate to reduce radiation dose to as low as reasonably achievable. COMPARISON:  CT 7/29/2018 RESULT: Acute change:   No evidence of an acute infarct or other acute parenchymal process. Hemorrhage:    No evidence of acute intracranial hemorrhage. Mass Lesion / Mass Effect:   There is no evidence of an intracranial mass or extraaxial fluid collection. No significant mass effect.   Chronic change:   Scattered patchy foci of low attenuation are present within supratentorial white matter which is a nonspecific finding but likely represents mild microvascular ischemia. Parenchyma: There is mild to moderate generalized volume loss. Encephalomalacia adjacent to the right caudate, unchanged. Ventricles:   Ventricular enlargement concordant with the degree of parenchymal volume loss. Paranasal sinuses and skull base:  Mild mucosal thickening within the ethmoid air cells and maxillary sinuses. Fluid within the right mastoid air cells. The skull base is unremarkable. Soft tissues unremarkable. No acute intracranial process. Xr Chest Portable    Result Date: 4/9/2021  EXAMINATION: CHEST PORTABLE VIEW  CLINICAL HISTORY: Weakness COMPARISONS: None  FINDINGS: Single  views of the chest is submitted. The cardiac silhouette is of normal size configuration. Pulmonary vascular unremarkable. Right sided trachea. Bibasilar areas atelectasis, patchy infiltrates in the bases. Trace/ small right pleural effusion  No Pneumothoraces. BIBASILAR AREAS ATELECTASIS, PATCHY INFILTRATES IN THE BASES. TRACE/SMALL RIGHT PLEURAL EFFUSION    Us Abdomen Limited Specify Organ? Liver, Gallbladder, Pancreas    Result Date: 4/9/2021  EXAMINATION:  US ABDOMEN LIMITED CLINICAL HISTORY:  cirrhosis, abd pain . Susy Neighbours COMPARISONS:  NONE AVAILABLE TECHNIQUE:  Transabdominal ultrasound the rectal quadrant. FINDINGS:  The liver shows a a nodularity to the margin. This a nonspecific finding can be seen with diffuse infiltrative process or cirrhosis. There is a coarse heterogeneous echotexture to the parenchyma. No discrete masses are identified. No intrahepatic biliary  dilatation. The gallbladder shows no surrounding pericholecystic fluid. The wall is mildly thickened measuring 4 mm. There is hyperechoic foci with posterior acoustic shadowing likely represent a combination of cholelithiasis and gallbladder sludge. Common bile duct  measures 3 mm. There is diffuse ascites present. 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 1. THE LIVER IS NODULAR. THIS A NONSPECIFIC AND CAN BE SEEN WITH BENIGN DIFFUSELY INFILTRATIVE PROCESSES AND/OR CIRRHOSIS. . 2. THERE IS A DIFFUSE HETEROGENEOUS ECHOTEXTURE TO THE LIVER. NO DEFINITIVE NODULE IS NOTED. HOWEVER ULTRASOUND IS LIMITED IN ITS ABILITY TO DIFFERENTIATE SOFT TISSUE OF SIMILAR ECHOGENICITY. CLINICALLY INDICATED COULD CONSIDER MRI TO FURTHER EVALUATE. 3. GALLBLADDER SLUDGE AND STONES. 4 THERE ARE FINDINGS OF THROMBUS OF THE MIDDLE, LEFT AND RIGHT PORTAL VEINS      . ASSESSMENT AND PLAN  1. Portal vein thrombosis  2. Cirrhosis of Liver with portal Hypertension   3. Ascites secondry to 2.  4. Hypokalemia   5. Normocytic Anemia and Thrombocytopenia most likely secondary to Hypersplenism  6. Retroperitoneal and mesenteric lymphadenopathy . Non Hodgkin's Lymphoma is a consideration   7. Hypercalcemia secondary to malignancy . R/O Hyperparathyroidism      Recommendations : I shall continue DVT prophylaxis . He will benefit from therapeutic paracentesis. Serum Alpha Fetoprotein will be  Helpful to rule out Hepatocellular carcinoma. I shall obtain Serum parathormone level . Nephrology is managing electrolyte abnormalities and Hypercalcemia . Please consider biopsy of retroperitonal lymph nodes to rule out malignancy. If Alpha Feto protein is high , He will need CT guided  Liver biopsy. Further management depends upon the work up . His prognosis is poor . Thanks for this consultation .      Electronically signed by Faraz Lucas MD on 4/11/2021 at 2:38 PM

## 2021-04-12 NOTE — CARE COORDINATION
PATIENT HAD PARACENTESIS 0ML REMOVED--PER DR. REGAN D/C FOR 1-2 DAYS. CM/LSW TO FOLLOW FOR ANY NEW D/C NEEDS OR CHANGES TO THE D/C PLAN. PATIENT D/C PLAN IS TO RETURN HOME W/ HHC.

## 2021-04-12 NOTE — FLOWSHEET NOTE
Patient awake and resting in bed; needed some assistance to drink fluid during lunch. States \" can't use his hand too well\" spilled some juice. Patient had a S/P paracentesis done, they removed 7715 ml. From left side of abdomen, bandaid dry and intacted. Albumin 50 given and also lasix 20 mgIV given. Voiding clear yelow urine in urinal and recorded. No complaints offered. Appetite fair.

## 2021-04-12 NOTE — PROGRESS NOTES
Department of Internal Medicine  General Internal Medicine  Attending Progress Note      SUBJECTIVE:  Pt seen and examined. No new complaints.   No nausea, vomiting, chest pain, or headache    OBJECTIVE      Medications    Current Facility-Administered Medications: potassium chloride (KLOR-CON M) extended release tablet 40 mEq, 40 mEq, Oral, BID WC  furosemide (LASIX) injection 20 mg, 20 mg, Intravenous, Daily  ipratropium-albuterol (DUONEB) nebulizer solution 1 ampule, 1 ampule, Inhalation, Q4H PRN  sodium chloride (OCEAN, BABY AYR) 0.65 % nasal spray 1 spray, 1 spray, Each Nostril, PRN  0.9 % sodium chloride infusion, , Intravenous, Continuous  sodium chloride flush 0.9 % injection 5-40 mL, 5-40 mL, Intravenous, 2 times per day  sodium chloride flush 0.9 % injection 5-40 mL, 5-40 mL, Intravenous, PRN  0.9 % sodium chloride infusion, 25 mL, Intravenous, PRN  enoxaparin (LOVENOX) injection 40 mg, 40 mg, Subcutaneous, Daily  promethazine (PHENERGAN) tablet 12.5 mg, 12.5 mg, Oral, Q6H PRN **OR** ondansetron (ZOFRAN) injection 4 mg, 4 mg, Intravenous, Q6H PRN  polyethylene glycol (GLYCOLAX) packet 17 g, 17 g, Oral, Daily PRN  acetaminophen (TYLENOL) tablet 650 mg, 650 mg, Oral, Q6H PRN **OR** acetaminophen (TYLENOL) suppository 650 mg, 650 mg, Rectal, Q6H PRN  potassium chloride (KLOR-CON M) extended release tablet 40 mEq, 40 mEq, Oral, PRN **OR** potassium bicarb-citric acid (EFFER-K) effervescent tablet 40 mEq, 40 mEq, Oral, PRN **OR** potassium chloride 10 mEq/100 mL IVPB (Peripheral Line), 10 mEq, Intravenous, PRN  magnesium sulfate 2000 mg in 50 mL IVPB premix, 2,000 mg, Intravenous, PRN  pantoprazole (PROTONIX) tablet 40 mg, 40 mg, Oral, QAM AC  insulin lispro (HUMALOG) injection vial 0-6 Units, 0-6 Units, Subcutaneous, TID WC  insulin lispro (HUMALOG) injection vial 0-3 Units, 0-3 Units, Subcutaneous, Nightly  glucose (GLUTOSE) 40 % oral gel 15 g, 15 g, Oral, PRN  dextrose 50 % IV solution, 12.5 g, Intravenous, PRN  glucagon (rDNA) injection 1 mg, 1 mg, Intramuscular, PRN  dextrose 5 % solution, 100 mL/hr, Intravenous, PRN  aspirin EC tablet 81 mg, 81 mg, Oral, Daily  atorvastatin (LIPITOR) tablet 40 mg, 40 mg, Oral, Nightly  clopidogrel (PLAVIX) tablet 75 mg, 75 mg, Oral, Daily  venlafaxine (EFFEXOR XR) extended release capsule 37.5 mg, 37.5 mg, Oral, Daily with breakfast  hydrALAZINE (APRESOLINE) injection 10 mg, 10 mg, Intravenous, Q6H PRN  Physical    VITALS:  /86   Pulse 95   Temp 97.2 °F (36.2 °C) (Oral)   Resp 16   Ht 5' 9\" (1.753 m)   Wt 240 lb (108.9 kg)   SpO2 95%   BMI 35.44 kg/m²   Constitutional: Awake and alert in no acute distress.  Lying in bed comfortably  Head: Normocephalic, atraumatic  Eyes: EOMI, PERRLA  ENT: moist mucous membranes  Neck: neck supple, trachea midline  Lungs: Good inspiratory effort, CTABL, no wheeze, no rhonchi, no rales  Heart: RRR, normal S1 and S2  GI: Soft, distended, non tender, no guarding, no rebound, +BS  MSK: no edema noted  Skin: warm, dry  Psych: strange affect  Data    CBC:   Lab Results   Component Value Date    WBC 5.0 04/12/2021    RBC 4.14 04/12/2021    HGB 12.2 04/12/2021    HCT 35.4 04/12/2021    MCV 85.5 04/12/2021    MCH 29.3 04/12/2021    MCHC 34.3 04/12/2021    RDW 16.5 04/12/2021     04/12/2021    MPV 10.6 09/10/2013     CMP:    Lab Results   Component Value Date     04/12/2021    K 3.1 04/12/2021     04/12/2021    CO2 20 04/12/2021    BUN 33 04/12/2021    CREATININE 1.33 04/12/2021    GFRAA >60.0 04/12/2021    LABGLOM 54.5 04/12/2021    GLUCOSE 75 04/12/2021    PROT 7.0 04/09/2021    LABALBU 3.0 04/09/2021    CALCIUM 11.0 04/12/2021    BILITOT 1.7 04/09/2021    ALKPHOS 142 04/09/2021    AST 74 04/09/2021    ALT 25 04/09/2021       ASSESSMENT AND PLAN      # New onset cirrhosis with ascites and associated lethargy/weakness  - malignancy is high on differential  - CT with nodularity of liver consistent with cirrhosis and large ascites  - abd soft, but distended. IR consulted for possible paracentesis  - US RUQ shows likely cirrhosis and middle, left and right portal vein thrombosis  - appreciate consult oncology in setting of hypercalcemia, thrombocytopenia, portal vein thrombosis.  Will need malignancy workup  - hepatitis panel negative  - GI consulted  - PT/OT  - for paracentesis on 4/12     # DELILAH with hypercalcemia  - last labs in 2018 were normal  - today creatinine 1.53  - nephrology consulted- calcitonin today  - may be part of underlying possibly malignancy  - albumin low and anasarca noted on CT    # Thrombocytopenia  - in setting of cirrhosis  - monitor for signs of bleeding  - anticoag will be at dvt prophylaxis dose for portal vein thrombosis as detailed in hematology note.      # DM2  - ISS     # Hx of CVA  - cont DAPT, statin     DVT: lovenox ppx in setting of thrombocytopenia    35 minutes total care time, >1/2 in unit/floor time and care coordination     Jewels Edmonds MD

## 2021-04-12 NOTE — PROGRESS NOTES
Physical Therapy Missed Treatment   Facility/Department: [facility] MED SURG [id number]/[id number]    NAME: Dusty Schilling    : 1960 (63 y.o.)  MRN: [id number]    Account: [de-identified]  Gender: male    Chart reviewed, attempted PT at 08:49AM. Patient unavailable 2° to:    [] Hold per nsg request    [x] Pt declined secondary to not being able to eat and feeling tired. Pt states \"I have been cooperative with everyone since I have been here but I just don't want to do anything because I don't feel good. \" Offered pt there ex or repositioning in bed, pt continues to decline. Education provided to pt on importance of getting up out of the bed to maintain strength and mobility while in the hospital. Pt nodding head in agreement and requesting to go back to sleep. [] Nsg notified   [x] Other notified    [] Pt. . off floor for test/procedure. [] Pt. Unavailable       Will attempt PT treatment again at earliest convenience.       Electronically signed by Kevon Coy PTA on 21 at 9:33 AM EDT

## 2021-04-12 NOTE — SEDATION DOCUMENTATION
NO SEDATION    1105 Pt to xray room 6 via cart. Pt A&Ox4. Pt denies any pain. Respirations even and unlabored, skin warm and dry. Abdomen distended. AR tech obtained images prior to start of procedure using U/S.     1110 Procedure explained. 1111 VSS. 1112 Consent signed. 200 Dr Mauro Reyes arrived, procedure explained. 46 Timeout completed for Percutaneous ultrasound guided paracentesis. 200 Using U/S, Dr. Mauro Reyes marked, prepped LLQ with Chloraprep and draped with sterile drape and towels. 1117 Site numbed with lidocaine 2%, total 10 mls. [id number] Centesis 5F catheter placed using Ultrasound guidance. Fluid appears turbid, cloudy, yellow. Catheter tubing connected to [place] machine to remove fluid. 1120 Specimen taken to lab by AR-tech. 1130 Pt tolerating procedure well, denies any pain. VSS. 1140 VSS. Pt tolerating procedure well, denies pain. 1149 VSS. Pt denies pain, tolerating procedure well. 1159 VSS. Pt tolerating procedure well, denies pain. 1206 Pt tolerated well. Total 7715 ml removed. Catheter removed, pressure held and bandaid applied. Verbal and tactile reassurance given throughout. 1210 Site soft, bandaid clean, dry, intact. 1211 Pt taken back to the room via cart by transporter. 1214 Tried to get hold of [employer]. Will try again. 1225 Report called to [employer].

## 2021-04-12 NOTE — PROGRESS NOTES
Renal Progress Note    Assessment and Plan:     65-year-old man with diabetes mellitus and hypertension not taking any medicines nor having any labs done over the last few years. Has a new hypercalcemia with a calcium level of 14, acute renal failure, hypokalemia with cirrhotic appearing liver as well as ascites. He says about 60 pound weight loss. partly on purpose with keto diet but the  combination of the hypercalcemia and significant weight loss is concerning for underlying malignancy.     Plan/  1-hydrating 100 cc an hour for hypercalcemia. Has some edema so will decrease to 50 cc/hr and add lasix. 2-gave calcitonin for just 2 doses  3- gave Zometa 4 mg on 4/11  4-check PTH, PTH related peptide, vitamin D levels, phosphorus levels as well as SPEP - so far pth and vit d levels are ok/low. pthrp and 1,25 vit d and spep pending  5-plan is for paracentesis. We will follow up on cytology of this  6- replete k    Patient Active Problem List:     Acute cerebrovascular accident (CVA) ([address]     Depression     Abdominal pain      Subjective:   Admit Date: 4/9/2021    Interval History: kidney function better. No cp. No sob. Calcium better but high. Got zometa.   For paracentesis today       Medications:   Scheduled Meds:   sodium chloride flush  5-40 mL Intravenous 2 times per day    enoxaparin  40 mg Subcutaneous Daily    pantoprazole  40 mg Oral QAM AC    insulin lispro  0-6 Units Subcutaneous TID WC    insulin lispro  0-3 Units Subcutaneous Nightly    aspirin  81 mg Oral Daily    atorvastatin  40 mg Oral Nightly    clopidogrel  75 mg Oral Daily    venlafaxine  37.5 mg Oral Daily with breakfast     Continuous Infusions:   sodium chloride 100 mL/hr at 04/12/21 0741    sodium chloride      dextrose         CBC:   Recent Labs     04/11/21  0730 04/12/21  0624   WBC 5.4 5.0   HGB 12.6* 12.2*   PLT 95* 103*     CMP:    Recent Labs     04/10/21  0602 04/11/21  0730 04/12/21  0623   * 137 136   K 3.4 3.3* 3.1*    104 106   CO2 22 21 20   BUN 32* 33* 33*   CREATININE 1.53* 1.30* 1.33*   GLUCOSE 82 73 75   CALCIUM 14.0* 12.3* 11.0*   LABGLOM 46.4* 56.0* 54.5*     Troponin:   Recent Labs     04/09/21  1215   TROPONINI 0.016*     BNP: No results for input(s): BNP in the last 72 hours. INR: No results for input(s): INR in the last 72 hours. Lipids:   Recent Labs     04/09/21  1215   LIPASE 28     Liver:   Recent Labs     04/09/21  1215   AST 74*   ALT 25   ALKPHOS 142*   PROT 7.0   LABALBU 3.0*   BILITOT 1.7*     Iron:  No results for input(s): IRONS, FERRITIN in the last 72 hours. Invalid input(s): LABIRONS  Urinalysis: No results for input(s): UA in the last 72 hours. Objective:   Vitals: /82   Pulse 99   Temp 97.7 °F (36.5 °C) (Oral)   Resp 18   Ht 5' 9\" (1.753 m)   Wt 240 lb (108.9 kg)   SpO2 94%   BMI 35.44 kg/m²    Wt Readings from Last 3 Encounters:   04/09/21 240 lb (108.9 kg)   07/29/18 272 lb (123.4 kg)   04/25/18 278 lb (126.1 kg)      24HR INTAKE/OUTPUT:      Intake/Output Summary (Last 24 hours) at 4/12/2021 0958  Last data filed at 4/12/2021 0817  Gross per 24 hour   Intake 3605 ml   Output 1375 ml   Net 2230 ml       Constitutional:  Alert, awake, no apparent distress   Skin:normal, no rash  HEENT:sclera anicteric. Head atraumatic normocephalic  Neck:supple with no thyromegally  Cardiovascular:  S1, S2 without m/r/g   Respiratory:  CTA B without w/r/r   Abdomen: +bs, soft, nt.   Distended with fluid wave  Ext: 1+ LE edema  Musculoskeletal:Intact  Neuro:Alert and oriented with no deficit      Electronically signed by Gamal Mon MD on 4/12/2021 at 9:58 AM

## 2021-04-13 NOTE — PROGRESS NOTES
Patient assessment done A&Ox4, lung sounds diminished with expiratory wheezes bilaterally, ABD distended, soft, round, non tender, bowel sounds active x4 band-aid from paracentesis CDI. Pt complains of abdominal pain 10/10 medication given per MAR, evening snack given. Denies other needs at this time, call light in reach.

## 2021-04-13 NOTE — PROGRESS NOTES
Pt assessment and VS complete. Pt stable. Pt medicated per MAR. Pt denies pain n/v lightheadedness dizziness. Pt asked for nutrition supplement. Ordered for pt with ok from Dr. Heath Uriostegui. No needs at this time will continue to monitor.

## 2021-04-13 NOTE — PROGRESS NOTES
Hospitalist Progress Note      Date of Admission: 4/9/2021  Chief Complaint:    Chief Complaint   Patient presents with    Hepatic Disease     ascites     Subjective:  No new complaints.   No nausea, vomiting, chest pain, or headache      Medications:    Infusion Medications    sodium chloride 50 mL/hr at 04/12/21 1925    sodium chloride      dextrose       Scheduled Medications    magnesium sulfate  2,000 mg Intravenous Once    furosemide  20 mg Intravenous BID    potassium chloride  40 mEq Oral BID WC    sodium chloride flush  5-40 mL Intravenous 2 times per day    enoxaparin  40 mg Subcutaneous Daily    pantoprazole  40 mg Oral QAM AC    insulin lispro  0-6 Units Subcutaneous TID WC    insulin lispro  0-3 Units Subcutaneous Nightly    aspirin  81 mg Oral Daily    atorvastatin  40 mg Oral Nightly    clopidogrel  75 mg Oral Daily    venlafaxine  37.5 mg Oral Daily with breakfast     PRN Meds: ipratropium-albuterol, sodium chloride, sodium chloride flush, sodium chloride, promethazine **OR** ondansetron, polyethylene glycol, acetaminophen **OR** acetaminophen, potassium chloride **OR** potassium alternative oral replacement **OR** potassium chloride, magnesium sulfate, glucose, dextrose, glucagon (rDNA), dextrose, hydrALAZINE    Intake/Output Summary (Last 24 hours) at 4/13/2021 1104  Last data filed at 4/13/2021 1021  Gross per 24 hour   Intake 2145 ml   Output 2500 ml   Net -355 ml     Exam:  /75   Pulse 95   Temp 98.4 °F (36.9 °C) (Oral)   Resp 16   Ht 5' 9\" (1.753 m)   Wt 240 lb (108.9 kg)   SpO2 93%   BMI 35.44 kg/m²   Head: Normocephalic, atraumatic  Sclera clear  Neck JVD flat  Lungs: normal effort of breathing    Labs:   Recent Labs     04/11/21  0730 04/12/21  0624 04/13/21  0607   WBC 5.4 5.0 4.1*   HGB 12.6* 12.2* 11.6*   HCT 36.8* 35.4* 33.8*   PLT 95* 103* 81*     Recent Labs     04/11/21  0730 04/12/21  0623 04/13/21  0607    136 137   K 3.3* 3.1* 3.7    106 106 CO2 21 20 20   BUN 33* 33* 30*   CREATININE 1.30* 1.33* 1.39*   CALCIUM 12.3* 11.0* 10.9*     Recent Labs     04/12/21  0909   INR 1.2     No results for input(s): Alejandra Maguire in the last 72 hours. Radiology:  IR US GUIDED PARACENTESIS   Final Result   1. Status post technically successful ultrasound-guided paracentesis. Esperanza Hyde is a Male of 63 years age, referred for Ultrasound Guided Paracentesis. PROCEDURE: Survey of the abdomen showed large amount of ascites fluid. After obtaining informed consent, the patient was positioned supine on the sonography table. Using ultrasound, the skin over the left hemiabdomen was locally anesthetized with 1% lidocaine. Following that, a Novavax needle was advanced into the fluid    pocket using ultrasound visualization. 7715cc, of turbid fluid were aspirated and sent for cytology, and pathology. The needle was removed, and hemostasis was obtained with pressure. A Band-Aid was placed. Post procedure images did not demonstrate hemorrhage at the target site. The patient tolerated the procedure well. The patient left the department in good condition. A radiology nurse was in presence monitoring vital signs, assisting throughout the procedure. US ABDOMEN LIMITED Specify organ? LIVER, GALLBLADDER, PANCREAS   Final Result   1. THE LIVER IS NODULAR. THIS A NONSPECIFIC AND CAN BE SEEN WITH BENIGN DIFFUSELY INFILTRATIVE PROCESSES AND/OR CIRRHOSIS. .   2. THERE IS A DIFFUSE HETEROGENEOUS ECHOTEXTURE TO THE LIVER. NO DEFINITIVE NODULE IS NOTED. HOWEVER   ULTRASOUND IS LIMITED IN ITS ABILITY TO DIFFERENTIATE SOFT TISSUE OF SIMILAR ECHOGENICITY. CLINICALLY INDICATED COULD CONSIDER MRI TO FURTHER EVALUATE. 3. GALLBLADDER SLUDGE AND STONES.   4 THERE ARE FINDINGS OF THROMBUS OF THE MIDDLE, LEFT AND RIGHT PORTAL VEINS      CT Head WO Contrast   Final Result      No acute intracranial process.                 CT ABDOMEN PELVIS WO CONTRAST Additional Contrast? None   Final Result      Cirrhotic liver morphology. Evaluation for liver lesions limited without contrast, apparent indeterminant low-attenuation lesions within the liver. Large volume ascites. Small pleural effusions. Anasarca. Mesenteric and retroperitoneal lymphadenopathy, nonspecific. Differential includes reactive process, infectious/inflammatory process, lymphoproliferative disorder (such as lymphoma), neoplastic process, etc.      Feces throughout the colon, especially within the rectal region, suggestive of constipation, better assessed clinically. Right nephrolithiasis.            ==========               XR CHEST PORTABLE   Final Result   BIBASILAR AREAS ATELECTASIS, PATCHY INFILTRATES IN THE BASES. TRACE/SMALL RIGHT PLEURAL EFFUSION        Assessment/Plan:    Hypercalcemia: Trending down. Concurrently also modifying diuretics while monitoring fluid status fluid status. This is being primarily managed by nephrology    Cirrhosis with ascites: Broad differential.  Status post paracentesis on April 12. Possible portal vein thrombosis, hematology reviewed and recommended DVT level prophylaxis at this time. Underlying thrombocytopenia noted. DELILAH: Overall trend improved    Thrombocytopenia: Most likely secondary to chronic liver disease with cirrhosis. Hematology has also evaluated. History of CVA noted.     35 minutes total care time, >1/2 in unit/floor time and care coordination     Carlota Huang MD

## 2021-04-13 NOTE — PROGRESS NOTES
Renal Progress Note    Assessment and Plan:     50-year-old man with diabetes mellitus and hypertension not taking any medicines nor having any labs done over the last few years. Has a new hypercalcemia with a calcium level of 14, acute renal failure, hypokalemia with cirrhotic appearing liver as well as ascites. He says about 60 pound weight loss. partly on purpose with keto diet but the  combination of the hypercalcemia and significant weight loss is concerning for underlying malignancy.     Plan/  1-cont ivf and increase lasix to 20 bid for high calcium  2-gave calcitonin for just 2 doses  3- gave Zometa 4 mg on 4/11  4-check PTH, PTH related peptide, vitamin D levels, phosphorus levels as well as SPEP - so far pth and vit d levels are ok/low. pthrp and 1,25 vit d and spep pending  5-f/u cytology of paracentesis    Patient Active Problem List:     Acute cerebrovascular accident (CVA) ([address]     Depression     Abdominal pain      Subjective:   Admit Date: 4/9/2021    Interval History: kidney function better. Had paracentesis with 7.7 liters out. onc note noted - check afp and consider biopsy of node.   Pt feels better      Medications:   Scheduled Meds:   magnesium sulfate  2,000 mg Intravenous Once    potassium chloride  40 mEq Oral BID WC    furosemide  20 mg Intravenous Daily    sodium chloride flush  5-40 mL Intravenous 2 times per day    enoxaparin  40 mg Subcutaneous Daily    pantoprazole  40 mg Oral QAM AC    insulin lispro  0-6 Units Subcutaneous TID WC    insulin lispro  0-3 Units Subcutaneous Nightly    aspirin  81 mg Oral Daily    atorvastatin  40 mg Oral Nightly    clopidogrel  75 mg Oral Daily    venlafaxine  37.5 mg Oral Daily with breakfast     Continuous Infusions:   sodium chloride 50 mL/hr at 04/12/21 1925    sodium chloride      dextrose         CBC:   Recent Labs     04/12/21  0624 04/13/21  0607   WBC 5.0 4.1*   HGB 12.2* 11.6*   * 81*     CMP:    Recent Labs 04/11/21  0730 04/12/21  0623 04/13/21  0607    136 137   K 3.3* 3.1* 3.7    106 106   CO2 21 20 20   BUN 33* 33* 30*   CREATININE 1.30* 1.33* 1.39*   GLUCOSE 73 75 81   CALCIUM 12.3* 11.0* 10.9*   LABGLOM 56.0* 54.5* 51.8*     Troponin:   No results for input(s): TROPONINI in the last 72 hours. BNP: No results for input(s): BNP in the last 72 hours. INR:   Recent Labs     04/12/21  0909   INR 1.2     Lipids:   No results for input(s): CHOL, LDLDIRECT, TRIG, HDL, AMYLASE, LIPASE in the last 72 hours. Liver:   No results for input(s): AST, ALT, ALKPHOS, PROT, LABALBU, BILITOT in the last 72 hours. Invalid input(s): BILDIR  Iron:  No results for input(s): IRONS, FERRITIN in the last 72 hours. Invalid input(s): LABIRONS  Urinalysis: No results for input(s): UA in the last 72 hours. Objective:   Vitals: /75   Pulse 95   Temp 98.4 °F (36.9 °C) (Oral)   Resp 16   Ht 5' 9\" (1.753 m)   Wt 240 lb (108.9 kg)   SpO2 93%   BMI 35.44 kg/m²    Wt Readings from Last 3 Encounters:   04/09/21 240 lb (108.9 kg)   07/29/18 272 lb (123.4 kg)   04/25/18 278 lb (126.1 kg)      24HR INTAKE/OUTPUT:      Intake/Output Summary (Last 24 hours) at 4/13/2021 0952  Last data filed at 4/13/2021 0606  Gross per 24 hour   Intake 2135 ml   Output 2500 ml   Net -365 ml       Constitutional:  Alert, awake, no apparent distress   Skin:normal, no rash  HEENT:sclera anicteric. Head atraumatic normocephalic  Neck:supple with no thyromegally  Cardiovascular:  S1, S2 without m/r/g   Respiratory:  CTA B without w/r/r   Abdomen: +bs, soft, nt.   Distended with fluid wave  Ext: 1+ LE edema  Musculoskeletal:Intact  Neuro:Alert and oriented with no deficit      Electronically signed by Monisha Marks MD on 4/13/2021 at 9:52 AM

## 2021-04-13 NOTE — ONCOLOGY
Hematology/Oncology  Attending Progress Note        CHIEF COMPLAINT/HPI:  Weakness, poor appetite    REVIEW OF SYSTEMS:    Unremarkable except for symptoms mentioned in HPI.     Current Inpatient Medications:    Current Facility-Administered Medications: furosemide (LASIX) injection 20 mg, 20 mg, Intravenous, BID  potassium chloride (KLOR-CON M) extended release tablet 40 mEq, 40 mEq, Oral, BID WC  ipratropium-albuterol (DUONEB) nebulizer solution 1 ampule, 1 ampule, Inhalation, Q4H PRN  sodium chloride (OCEAN, BABY AYR) 0.65 % nasal spray 1 spray, 1 spray, Each Nostril, PRN  0.9 % sodium chloride infusion, , Intravenous, Continuous  sodium chloride flush 0.9 % injection 5-40 mL, 5-40 mL, Intravenous, 2 times per day  sodium chloride flush 0.9 % injection 5-40 mL, 5-40 mL, Intravenous, PRN  0.9 % sodium chloride infusion, 25 mL, Intravenous, PRN  enoxaparin (LOVENOX) injection 40 mg, 40 mg, Subcutaneous, Daily  promethazine (PHENERGAN) tablet 12.5 mg, 12.5 mg, Oral, Q6H PRN **OR** ondansetron (ZOFRAN) injection 4 mg, 4 mg, Intravenous, Q6H PRN  polyethylene glycol (GLYCOLAX) packet 17 g, 17 g, Oral, Daily PRN  acetaminophen (TYLENOL) tablet 650 mg, 650 mg, Oral, Q6H PRN **OR** acetaminophen (TYLENOL) suppository 650 mg, 650 mg, Rectal, Q6H PRN  potassium chloride (KLOR-CON M) extended release tablet 40 mEq, 40 mEq, Oral, PRN **OR** potassium bicarb-citric acid (EFFER-K) effervescent tablet 40 mEq, 40 mEq, Oral, PRN **OR** potassium chloride 10 mEq/100 mL IVPB (Peripheral Line), 10 mEq, Intravenous, PRN  magnesium sulfate 2000 mg in 50 mL IVPB premix, 2,000 mg, Intravenous, PRN  pantoprazole (PROTONIX) tablet 40 mg, 40 mg, Oral, QAM AC  insulin lispro (HUMALOG) injection vial 0-6 Units, 0-6 Units, Subcutaneous, TID WC  insulin lispro (HUMALOG) injection vial 0-3 Units, 0-3 Units, Subcutaneous, Nightly  glucose (GLUTOSE) 40 % oral gel 15 g, 15 g, Oral, PRN  dextrose 50 % IV solution, 12.5 g, Intravenous, PRN  glucagon (rDNA) injection 1 mg, 1 mg, Intramuscular, PRN  dextrose 5 % solution, 100 mL/hr, Intravenous, PRN  aspirin EC tablet 81 mg, 81 mg, Oral, Daily  atorvastatin (LIPITOR) tablet 40 mg, 40 mg, Oral, Nightly  clopidogrel (PLAVIX) tablet 75 mg, 75 mg, Oral, Daily  venlafaxine (EFFEXOR XR) extended release capsule 37.5 mg, 37.5 mg, Oral, Daily with breakfast  hydrALAZINE (APRESOLINE) injection 10 mg, 10 mg, Intravenous, Q6H PRN    PHYSICAL EXAM:    VITALS:  /75   Pulse 95   Temp 98.4 °F (36.9 °C) (Oral)   Resp 16   Ht 5' 9\" (1.753 m)   Wt 240 lb (108.9 kg)   SpO2 93%   BMI 35.44 kg/m²   INTAKE/OUTPUT:      Intake/Output Summary (Last 24 hours) at 4/13/2021 1355  Last data filed at 4/13/2021 1021  Gross per 24 hour   Intake 1945 ml   Output 2300 ml   Net -355 ml     CONSTITUTIONAL:  awake, alert, cooperative, no apparent distress, and appears stated age  ENT:  Normocephalic, without obvious abnormality. NECK:  Supple, symmetrical, trachea midline, no adenopathy, thyroid symmetric, not enlarged and no tenderness, skin normal  LUNGS:  No increased work of breathing, good air exchange, clear to auscultation bilaterally, no crackles or wheezing  CARDIOVASCULAR:  Normal apical impulse, regular rate and rhythm, normal S1 and S2, no S3 or S4, and no murmur noted  ABDOMEN:  Ascites. Liver is palpable. CHEST/BREASTS: no axillary or supraclavicular adenopathy  MUSCULOSKELETAL:  There is no redness, warmth, or swelling of the joints. Full range of motion noted. Tone is normal.  NEUROLOGIC:  Awake, alert, oriented to name, place and time. Cranial nerves II-XII are grossly intact. Motor is 5 out of 5 bilaterally.   SKIN:  no bruising or bleeding and no rashes    DATA:      PT/INR:  No results found for: PTINR  PTT:  No results found for: APTT  CMP:    Lab Results   Component Value Date     04/13/2021    K 3.7 04/13/2021     04/13/2021    CO2 20 04/13/2021    BUN 30 04/13/2021    PROT 6.5 04/10/2021 Magnesium:    Lab Results   Component Value Date    MG 1.3 04/12/2021     Phosphorus:  No components found for: PO4  Calcium:  No components found for: CA  CBC:    Lab Results   Component Value Date    WBC 4.1 04/13/2021    RBC 3.98 04/13/2021    HGB 11.6 04/13/2021    HCT 33.8 04/13/2021    MCV 85.0 04/13/2021    RDW 16.5 04/13/2021    PLT 81 04/13/2021     DIFF:    Lab Results   Component Value Date    MCV 85.0 04/13/2021    RDW 16.5 04/13/2021      LDH:  No results found for: LDH  Uric Acid:  No components found for: URIC    CBC with Differential:    Lab Results   Component Value Date    WBC 4.1 04/13/2021    RBC 3.98 04/13/2021    HGB 11.6 04/13/2021    HCT 33.8 04/13/2021    PLT 81 04/13/2021    MCV 85.0 04/13/2021    MCH 29.2 04/13/2021    MCHC 34.4 04/13/2021    RDW 16.5 04/13/2021    LYMPHOPCT 6.1 04/09/2021    MONOPCT 6.2 04/09/2021    BASOPCT 0.3 04/09/2021    MONOSABS 0.4 04/09/2021    LYMPHSABS 0.4 04/09/2021    EOSABS 0.0 04/09/2021    BASOSABS 0.0 04/09/2021     CMP:    Lab Results   Component Value Date     04/13/2021    K 3.7 04/13/2021     04/13/2021    CO2 20 04/13/2021    BUN 30 04/13/2021    CREATININE 1.39 04/13/2021    GFRAA >60.0 04/13/2021    LABGLOM 51.8 04/13/2021    GLUCOSE 81 04/13/2021    PROT 6.5 04/10/2021    LABALBU 2.89 04/10/2021    CALCIUM 10.9 04/13/2021    BILITOT 1.7 04/09/2021    ALKPHOS 142 04/09/2021    AST 74 04/09/2021    ALT 25 04/09/2021       RADIOLOGY RESULTS:  Ct Abdomen Pelvis Wo Contrast Additional Contrast? None    Result Date: 4/9/2021  EXAMINATION:  CT ABDOMEN PELVIS WO CONTRAST HISTORY:   Ascites, new onset weakness, fall, abdominal pain. TECHNIQUE: Non-IV contrast imaging of the abdomen and pelvis was performed using standard technique, scanning from just above the dome of the diaphragm to the symphysis pubis. Including coronal and sagittal reconstructions. Unenhanced imaging is limited for the evaluation of some intra-abdominal and pelvic pathology. Contrast: IV: None Oral:  None. All CT scans at this facility use dose modulation, iterative reconstruction, and/or weight based dosing when appropriate to reduce radiation dose to as low as reasonably achievable. COMPARISON: None. RESULT: Abdomen / Pelvis: Liver: Cirrhotic liver morphology. Heterogeneous appearance of the liver. Assessment for hepatic lesions limited without contrast. Apparent low-attenuation lesions within the liver. Biliary: Extensive cholelithiasis. No distinct gallbladder wall thickening. Gallbladder mildly distended. Pancreas: Unremarkable. Spleen: Splenomegaly measuring around 15.0 cm. Adrenals: No mass. Kidneys: 3 mm calculus within the right lower pole. Punctate interpolar right-sided calculus. Other probable vascular calcifications. No hydronephrosis. No suspicious renal lesion within the unenhanced kidneys. GI Tract: Feces throughout the colon, especially within the rectal region. Diverticulosis, without evidence for diverticulitis. No small bowel dilation. Lymph Nodes: Multiple enlarged bulky mesenteric lymph nodes, with large conglomerate at the level of the mesenteric root. For example in the mid upper abdomen lymph node measuring approximately 3.7 x 1.9 cm (series 2 image 27). Enlarged retroperitoneal lymph nodes. For example para-aortic lymph node measuring 2.9 x 2.3 cm (series 2 image 44). Mesentery/peritoneum/retroperitoneum: Large volume ascites, especially involving the perihepatic and perisplenic regions, paracolic gutters, and tracking to the pelvis. No loculated collection. Vasculature: Mild arterial atherosclerotic disease without aneurysm. Apparent mesenteric collateral vessels. Pelvis: Ascites tracks into the pelvis. No bladder wall thickening. Small fat-containing periumbilical hernia. Bones: No acute osseous findings. No destructive osseous lesions. Osteopenia. Bridging endplate osteophytes within the spine. Soft tissues: Anasarca.  Lower thorax: Small bilateral pleural effusions. Coronary stent/calcifications. Cirrhotic liver morphology. Evaluation for liver lesions limited without contrast, apparent indeterminant low-attenuation lesions within the liver. Large volume ascites. Small pleural effusions. Anasarca. Mesenteric and retroperitoneal lymphadenopathy, nonspecific. Differential includes reactive process, infectious/inflammatory process, lymphoproliferative disorder (such as lymphoma), neoplastic process, etc. Feces throughout the colon, especially within the rectal region, suggestive of constipation, better assessed clinically. Right nephrolithiasis. ==========     Ct Head Wo Contrast    Result Date: 4/9/2021  EXAMINATION:  CT HEAD WO CONTRAST HISTORY:  Fall. Confusion. TECHNIQUE:  Serial axial images without IV contrast were obtained from the vertex to the foramen magnum. Sagittal and coronal reconstructions. All CT scans at this facility use dose modulation, iterative reconstruction, and/or weight based dosing when appropriate to reduce radiation dose to as low as reasonably achievable. COMPARISON:  CT 7/29/2018 RESULT: Acute change:   No evidence of an acute infarct or other acute parenchymal process. Hemorrhage:    No evidence of acute intracranial hemorrhage. Mass Lesion / Mass Effect:   There is no evidence of an intracranial mass or extraaxial fluid collection. No significant mass effect. Chronic change:   Scattered patchy foci of low attenuation are present within supratentorial white matter which is a nonspecific finding but likely represents mild microvascular ischemia. Parenchyma: There is mild to moderate generalized volume loss. Encephalomalacia adjacent to the right caudate, unchanged. Ventricles:   Ventricular enlargement concordant with the degree of parenchymal volume loss. Paranasal sinuses and skull base:  Mild mucosal thickening within the ethmoid air cells and maxillary sinuses. Fluid within the right mastoid air cells.   The skull base is FURTHER EVALUATE. 3. GALLBLADDER SLUDGE AND STONES. 4 THERE ARE FINDINGS OF THROMBUS OF THE MIDDLE, LEFT AND RIGHT PORTAL VEINS    Ir Us Guided Paracentesis    1. Status post technically successful ultrasound-guided paracentesis. María Elena Ratliff is a Male of 63 years age, referred for Ultrasound Guided Paracentesis. PROCEDURE: Survey of the abdomen showed large amount of ascites fluid. After obtaining informed consent, the patient was positioned supine on the sonography table. Using ultrasound, the skin over the left hemiabdomen was locally anesthetized with 1% lidocaine. Following that, a Yueh needle was advanced into the fluid pocket using ultrasound visualization. 7715cc, of turbid fluid were aspirated and sent for cytology, and pathology. The needle was removed, and hemostasis was obtained with pressure. A Band-Aid was placed. Post procedure images did not demonstrate hemorrhage at the target site. The patient tolerated the procedure well. The patient left the department in good condition. A radiology nurse was in presence monitoring vital signs, assisting throughout the procedure. ASSESSMENT AND PLAN:  Hypercalcemia (14.6), RP adenopathy, PVT, and cirrhosis. Patient is clinically back at baseline. Calcium level now improved. Hypercalcemia in the context of low PTH is usually malignant except in rare cases like increased PO calcium (Milk Alkali). Ascites fluid seems transudative (more suggestive of cirrhosis). RP adenopathy sometimes is a mistake on non contrast CT readings in patient seen with varices. However, these lesions are larger (over 3 cm) suggestive of cancer. PVT is controversial.  I think prophylactic dose anticoagulation is reasonable. Wait cytology and AFP results. If negative I would consider RP adenopathy biopsy as inpatient or outpatient.         Electronically signed by Catie Babcock DO on 4/13/21 at 1:55 PM EDT

## 2021-04-13 NOTE — PROGRESS NOTES
Physical Therapy Missed Treatment   Facility/Department: [facility] MED SURG Q476/X284-81    NAME: Litzy Quiroga    : 1960 (63 y.o.)  MRN: [id number]    Account: [de-identified]  Gender: male    Chart reviewed, attempted PT at 1550. Patient unavailable 2° to:    [] Hold per nsg request    [x] Pt declined Max encouragement and education given, pt unwilling to work with therapy this date stating that he promises he will work tomorrow. Alternate tx plans offered, pt still unwilling. [] Pt. . off floor for test/procedure. [] Pt. Unavailable       Will attempt PT treatment again at earliest convenience.       Electronically signed by Jeanine Eid PTA on 21 at 4:01 PM EDT

## 2021-04-13 NOTE — CARE COORDINATION
LSW met with pt to discuss therapy recommendation of DC to SNF. Pt given SNF list for freedom of choice. He would also like to discuss this with his niece and nephew. Pt agreeable to LSW calling niece and nephew. Phone call made to nephew Angélica Cole who agreed to speak to pt about plan. Phone call received from ravindra Tran. She stated she agrees with DC to SNF and will assist pt in picking one on 4/14.

## 2021-04-13 NOTE — CONSULTS
Relationship status: None    Intimate partner violence     Fear of current or ex partner: None     Emotionally abused: None     Physically abused: None     Forced sexual activity: None   Other Topics Concern    None   Social History Narrative    None       Allergies   Allergen Reactions    Codeine      Pt states he is not allergic        No current facility-administered medications on file prior to encounter. Current Outpatient Medications on File Prior to Encounter   Medication Sig Dispense Refill    albuterol sulfate HFA (VENTOLIN HFA) 108 (90 Base) MCG/ACT inhaler Inhale 2 puffs into the lungs every 6 hours as needed for Shortness of Breath      aspirin 81 MG EC tablet Take 1 tablet by mouth daily 30 tablet 3    atorvastatin (LIPITOR) 40 MG tablet Take 1 tablet by mouth nightly 30 tablet 3    clopidogrel (PLAVIX) 75 MG tablet Take 1 tablet by mouth daily 30 tablet 3    venlafaxine (EFFEXOR XR) 37.5 MG extended release capsule Take 1 capsule by mouth daily (with breakfast) 30 capsule 3    glyBURIDE (DIABETA) 5 MG tablet Take 10 mg by mouth 2 times daily (with meals)      lisinopril (PRINIVIL;ZESTRIL) 20 MG tablet Take 20 mg by mouth daily      metFORMIN (GLUCOPHAGE) 1000 MG tablet Take 1,000 mg by mouth 2 times daily (with meals)         Review of Systems   Constitutional: Positive for fatigue. Negative for chills, diaphoresis and fever. HENT: Negative. Negative for congestion, ear pain, hearing loss and tinnitus. Eyes: Negative. Negative for photophobia. Respiratory: Negative. Negative for cough, shortness of breath and wheezing. Cardiovascular: Negative. Negative for chest pain, palpitations and leg swelling. Gastrointestinal: Positive for abdominal distention and abdominal pain. Negative for constipation, diarrhea, nausea and vomiting. Genitourinary: Negative. Negative for dysuria, flank pain, frequency, hematuria and urgency. Musculoskeletal: Positive for gait problem. Negative for back pain and neck pain. Skin: Negative. Negative for rash. Allergic/Immunologic: Negative for environmental allergies. Neurological: Positive for weakness. Negative for dizziness, tremors and headaches. Hematological: Does not bruise/bleed easily. Psychiatric/Behavioral: Negative. Negative for hallucinations and suicidal ideas. The patient is not nervous/anxious. OBJECTIVE:  /75   Pulse 95   Temp 98.4 °F (36.9 °C) (Oral)   Resp 16   Ht 5' 9\" (1.753 m)   Wt 240 lb (108.9 kg)   SpO2 93%   BMI 35.44 kg/m²     Physical Exam  Constitutional:       General: He is not in acute distress. Appearance: He is well-developed. He is ill-appearing. He is not diaphoretic. HENT:      Head: Normocephalic and atraumatic. Nose: Nose normal.      Mouth/Throat:      Pharynx: No oropharyngeal exudate. Eyes:      General: Scleral icterus present. Right eye: No discharge. Left eye: No discharge. Conjunctiva/sclera: Conjunctivae normal.   Neck:      Musculoskeletal: Neck supple. Thyroid: No thyromegaly. Vascular: No JVD. Trachea: No tracheal deviation. Cardiovascular:      Rate and Rhythm: Normal rate and regular rhythm. Heart sounds: Normal heart sounds. No murmur. No friction rub. No gallop. Pulmonary:      Effort: No respiratory distress. Breath sounds: No stridor. No wheezing or rales. Chest:      Chest wall: No tenderness. Abdominal:      General: Bowel sounds are normal. There is distension. Palpations: Abdomen is soft. There is no mass. Tenderness: There is abdominal tenderness. There is no guarding or rebound. Hernia: No hernia is present. Musculoskeletal:         General: No tenderness or deformity. Skin:     General: Skin is dry. Coloration: Skin is not pale. Findings: No erythema or rash. Neurological:      Mental Status: He is alert and oriented to person, place, and time.       Cranial Nerves: No cranial nerve deficit. Psychiatric:         Behavior: Behavior normal.         Thought Content:  Thought content normal.         Judgment: Judgment normal.         ASSESSMENT ANDPLAN:    Assessment: Patient with new diagnosis of nonalcoholic steatohepatitis with cirrhosis and ascites    Plan: Diagnostic and therapeutic ultrasound-guided paracentesis    Gela Soni MD, Huseyin Telles

## 2021-04-14 NOTE — CARE COORDINATION
Met with pt who has not yet chosen a SNF and was not sure if he would go. He would like to know vaping policy of [facility], [facility] and [place]. Message for first two. Received info on vaping policy and shared with pt  He would like DC to [facility].

## 2021-04-14 NOTE — DISCHARGE INSTR - COC
Continuity of Care Form    Patient Name: Ashwini Weller   :  1960  MRN:  [id number]    Admit date:  2021  Discharge date:  21    Code Status Order: Full Code   Advance Directives:   Advance Care Flowsheet Documentation       Date/Time Healthcare Directive Type of Healthcare Directive Copy in [address] Agent's Name Healthcare Agent's Phone Number    21 1601  No, patient does not have an advance directive for healthcare treatment      Armando 27            Admitting Physician:  Soco Ingram DO  PCP: Sukhi Montesinos MD    Discharging Nurse: MD  [address] Unit/Room#: K081/M936-38  Discharging Unit Phone Number: [phone]    Emergency Contact:   Extended Emergency Contact Information  Primary Emergency Contact: Gil Garber of [address] [place] [address] Phone: [phone]  Relation: Niece/Nephew  Secondary Emergency Contact: Susie Hunter  Mobile Phone: [phone]  Relation: Niece/Nephew    Past Surgical History:  Past Surgical History:   Procedure Laterality Date    PARACENTESIS Left 2021    7715 mls removed  by Dr Yun Holland       Immunization History: There is no immunization history on file for this patient.     Active Problems:  Patient Active Problem List   Diagnosis Code    [facility] I[facility]9    Depression F32.9    Abdominal pain R10.9       Isolation/Infection:   Isolation            No Isolation          Patient Infection Status       None to display            Nurse Assessment:  Last Vital Signs: /78   Pulse 95   Temp 97.5 °F (36.4 °C) (Oral)   Resp 18   Ht 5' 9\" (1.753 m)   Wt 240 lb (108.9 kg)   SpO2 95%   BMI 35.44 kg/m²     Last documented pain score (0-10 scale): Pain Level: 0  Last Weight:   Wt Readings from Last 1 Encounters:   21 240 lb (108.9 kg)     Mental Status:  alert    IV Access:  - None    Nursing Mobility/ADLs:  Walking   Assisted  Transfer Assisted  Bathing  Assisted  Dressing  Assisted  Toileting  Assisted  Feeding  Independent  Med Admin  Assisted  Med Delivery   whole    Wound Care Documentation and Therapy:        Elimination:  Continence:   · Bowel: No  · Bladder: No  Urinary Catheter: None   Colostomy/Ileostomy/Ileal Conduit: No       Date of Last BM: 4/14/21    Intake/Output Summary (Last 24 hours) at 4/14/2021 1243  Last data filed at 4/14/2021 0545  Gross per 24 hour   Intake 1853.7 ml   Output 850 ml   Net 1003.7 ml     I/O last 3 completed shifts: In: 1863.7 [P.O.:672; I.V.:1191.7]  Out: 850 [Urine:850]    Safety Concerns:     History of Falls (last 30 days)    Impairments/Disabilities:      None    Nutrition Therapy:  Current Nutrition Therapy:   - Oral Diet:  Carb Control 4 carbs/meal (1800kcals/day)    Routes of Feeding: Oral  Liquids: No Restrictions  Daily Fluid Restriction: no  Last Modified Barium Swallow with Video (Video Swallowing Test): not done    Treatments at the Time of Hospital Discharge:   Respiratory Treatments: ***  Oxygen Therapy:  is not on home oxygen therapy.   Ventilator:    - No ventilator support    Rehab Therapies: Physical Therapy, OT  Weight Bearing Status/Restrictions: No weight bearing restirctions  Other Medical Equipment (for information only, NOT a DME order):  walker  Other Treatments: ***    Patient's personal belongings (please select all that are sent with patient):  None    RN SIGNATURE:  Electronically signed by Ezequiel Walker RN on 4/14/21 at 12:56 PM EDT    CASE MANAGEMENT/SOCIAL WORK SECTION    Inpatient Status Date: 4/9/2021    Readmission Risk Assessment Score:  Readmission Risk              Risk of Unplanned Readmission:        16           Discharging to Facility/ Agency   · Name: [facility]  · Address:  · Phone:[phone]      / signature: Electronically signed by Adina Lake RN on 4/14/21 at 3:14 PM EDT    PHYSICIAN SECTION    Prognosis: Good    Condition at Discharge: Stable    Rehab Potential (if transferring to Rehab): {DSOUYUK}    Physician Certification: I certify the above information and transfer of Therese Ivory  is necessary for the continuing treatment of the diagnosis listed and that he requires [facility] for less 30 days.      Update Admission H&P: {CHP DME Changes in IIAHD:[id number]}    PHYSICIAN SIGNATURE:  Electronically signed by Saeed Krishnan MD on 21 at 3:11 PM EDT

## 2021-04-14 NOTE — PROGRESS NOTES
[facility]ER[facility] MED SURG TREATMENT NOTE     Date: 2021  Patient Name: Clive Tucker        MRN: [id number]  Account: [de-identified]   : 1960  (63 y.o.)  Room: [place] [address]    Chart Review:  Diagnosis:  The primary encounter diagnosis was Ascites of liver. Diagnoses of Generalized weakness, Intermittent confusion, Cirrhosis of liver with ascites, unspecified hepatic cirrhosis type ([address] and Renal insufficiency were also pertinent to this visit. Restrictions:    Restrictions/Precautions  Restrictions/Precautions: Fall Risk  Required Braces or Orthoses?: No     Subjective:  Patient states:  \"What do you want me to do? \"  Pain:  Start of tx:  Pre Treatment Pain Screening  Pain at present: 0  Scale Used: Numeric Score  Intervention List: Patient able to continue with treatment, Patient declined any intervention    End of tx:  Pain Assessment  Patient Currently in Pain: Denies  Pain Assessment: 0-10  Pain Level: 0    Objective:  Upon OT arrival, pt supine in bed resting. Pt able to be easily awakened and agreeable to OT treatment session. Pt completed supine to sit transfer at [facility] 62 and sat EOB to complete B UE exercises as mentioned below. Pt able to carry conversation during exercises but requires min rest breaks to complete. After exercises, pt completed sit to supine transfer at SBA. Call light left in reach and bed alarm activated.      Therapy key for assistance levels    Independent = Pt. is able to perform task with no assistance but may require a device   Stand by assistance = Pt. does not perform task at an independent level but does not need physical assistance, requires verbal cues  Minimal, Moderate, Maximal Assistance = Pt. requires physical assistance (25%, 50%, 75% assist from helper) for task but is able to actively participate in task   Dependent = Pt. requires total assistance with task and is not able to actively participate with task completion    Functional Balance: Balance  Sitting Balance: Supervision  Standing Balance: Stand by assistance     Cognition:  Cognition  Overall Cognitive Status: Exceptions  Arousal/Alertness: Delayed responses to stimuli  Following Commands: Inconsistently follows commands  Attention Span: Difficulty attending to directions, Difficulty dividing attention  Memory: Decreased recall of precautions, Decreased short term memory, Decreased long term memory  Safety Judgement: Decreased awareness of need for assistance, Decreased awareness of need for safety  Problem Solving: Assistance required to generate solutions, Assistance required to correct errors made, Assistance required to identify errors made, Assistance required to implement solutions  Insights: Decreased awareness of deficits  Initiation: Requires cues for some  Sequencing: Requires cues for some  Cognition Comment: Decreased safety and sequencing, verbal cues to initiate    Bed Mobility  Bed mobility  Supine to Sit: Contact guard assistance  Sit to Supine: Stand by assistance  Comment: pt requires increased time to complete    UE Exercises:  Type of ROM/Therapeutic Exercise  Type of ROM/Therapeutic Exercise: Free weights  Comment: Pt completed UE exercises seated EOB while unsupported to improve strength and endurance during ADLs and functional transfers. Pt completed 1x10 reps of each exercise with rest breaks as needed. Pt requires min verbal cues for correct form and for safety concerns. Exercises  Shoulder Flexion: 1x10 reps  Shoulder Extension: 1x10 reps  Shoulder ABduction: 1x10 reps  Shoulder ADduction: 1x10 reps  Elbow Flexion: 1x10 reps  Elbow Extension: 1x10 reps  Supination: 1x10 reps  Pronation: 1x10 reps    Treatment consisted of:  Strengthening    Assessment/Discharge Disposition:  Assessment: Pt demonstrates min decreased safety awareness and strength during UE exercises. Pt continues to benefit from OT services to maximize independence and safety during ADLs and IADLs. Performance deficits / Impairments: Decreased functional mobility , Decreased ADL status, Decreased strength, Decreased endurance, Decreased balance, Decreased high-level IADLs, Decreased fine motor control, Decreased coordination, Decreased cognition, Decreased safe awareness  Prognosis: Good  Discharge Recommendations: Continue to assess pending progress  History: Pt's medical history is moderately complex  Exam: Pt. has 10 performance deficits  Assistance / Modification: Pt. requires mod A      6-Click  How much help for putting on and taking off regular lower body clothing?: A Lot  How much help for Bathing?: A Lot  How much help for Toileting?: A Lot  How much help for putting on and taking off regular upper body clothing?: A Lot  How much help for taking care of personal grooming?: A Little  How much help for eating meals?: A Little  AM-PAC Inpatient Daily Activity Raw Score: 14  AM-PAC Inpatient ADL T-Scale Score : 33.39  ADL Inpatient CMS 0-100% Score: 59.67    Plan:  Continue OT per POC    Goals/Plan:    Improve Strength    Minutes:    OT Individual Minutes  Time In: 1540  Time Out: 1600  Minutes: 20    Therapeutic activities: 20 minutes    Electronically signed by:    Luis Daniel Vargas  4/14/2021, 4:12 PM

## 2021-04-14 NOTE — PROGRESS NOTES
Renal Progress Note    Assessment and Plan:     56-year-old man with diabetes mellitus and hypertension not taking any medicines nor having any labs done over the last few years. Has a new hypercalcemia with a calcium level of 14, acute renal failure, hypokalemia with cirrhotic appearing liver as well as ascites. He says about 60 pound weight loss. partly on purpose with keto diet but the  combination of the hypercalcemia and significant weight loss is concerning for underlying malignancy. Got calcitonin x 2 doses first day. Gave zometa 4 mg on 4/11. Has also been on ivf and lasix. pth is low. Vit d low. 1-25 vit d low. spep normal.  Pthrp, afp and fluid cytology is pending. Reviewed oncology note - appreciate input.       Plan/  1- change lasix iv to oral and add aldactone as fluid is transudative  2. F/u cytology / afp and plan otherwise  3. Calcium has almost normalized but has been treated. Could be dc'd at any point from kidney standpoint    Patient Active Problem List:     Acute cerebrovascular accident (CVA) ([address]     Depression     Abdominal pain      Subjective:   Admit Date: 4/9/2021    Interval History: kidney function better. Had paracentesis with 7.7 liters out. onc note noted.         Medications:   Scheduled Meds:   furosemide  20 mg Intravenous BID    potassium chloride  40 mEq Oral BID WC    sodium chloride flush  5-40 mL Intravenous 2 times per day    enoxaparin  40 mg Subcutaneous Daily    pantoprazole  40 mg Oral QAM AC    insulin lispro  0-6 Units Subcutaneous TID WC    insulin lispro  0-3 Units Subcutaneous Nightly    aspirin  81 mg Oral Daily    atorvastatin  40 mg Oral Nightly    clopidogrel  75 mg Oral Daily    venlafaxine  37.5 mg Oral Daily with breakfast     Continuous Infusions:   sodium chloride 50 mL/hr at 04/14/21 0545    sodium chloride      dextrose         CBC:   Recent Labs     04/12/21  0624 04/13/21  0607 04/14/21  0622   WBC 5.0 4.1* 5.4   HGB 12.2* 11.6* 12.9*   * 81*  --      CMP:    Recent Labs     04/12/21  0623 04/13/21  0607 04/14/21  0622    137 135   K 3.1* 3.7 3.9    106 105   CO2 20 20 19*   BUN 33* 30* 30*   CREATININE 1.33* 1.39* 1.39*   GLUCOSE 75 81 89   CALCIUM 11.0* 10.9* 10.1*   LABGLOM 54.5* 51.8* 51.8*     Troponin:   No results for input(s): TROPONINI in the last 72 hours. BNP: No results for input(s): BNP in the last 72 hours. INR:   Recent Labs     04/12/21  0909   INR 1.2     Lipids:   No results for input(s): CHOL, LDLDIRECT, TRIG, HDL, AMYLASE, LIPASE in the last 72 hours. Liver:   No results for input(s): AST, ALT, ALKPHOS, PROT, LABALBU, BILITOT in the last 72 hours. Invalid input(s): BILDIR  Iron:  No results for input(s): IRONS, FERRITIN in the last 72 hours. Invalid input(s): LABIRONS  Urinalysis: No results for input(s): UA in the last 72 hours. Objective:   Vitals: /75   Pulse 95   Temp 98.4 °F (36.9 °C) (Oral)   Resp 16   Ht 5' 9\" (1.753 m)   Wt 240 lb (108.9 kg)   SpO2 93%   BMI 35.44 kg/m²    Wt Readings from Last 3 Encounters:   04/09/21 240 lb (108.9 kg)   07/29/18 272 lb (123.4 kg)   04/25/18 278 lb (126.1 kg)      24HR INTAKE/OUTPUT:      Intake/Output Summary (Last 24 hours) at 4/14/2021 0816  Last data filed at 4/14/2021 0545  Gross per 24 hour   Intake 1863.7 ml   Output 850 ml   Net 1013.7 ml       Constitutional:  Alert, awake, no apparent distress   Skin:normal, no rash  HEENT:sclera anicteric. Head atraumatic normocephalic  Neck:supple with no thyromegally  Cardiovascular:  S1, S2 without m/r/g   Respiratory:  CTA B without w/r/r   Abdomen: +bs, soft, nt.   Distended with fluid wave  Ext: 1+ LE edema  Musculoskeletal:Intact  Neuro:Alert and oriented with no deficit      Electronically signed by Rubia Mortensen MD on 4/14/2021 at 8:16 AM

## 2021-04-14 NOTE — PROGRESS NOTES
assistance(slight retro LOB when backing up to toilet.)  Comment: VC for hand placement with poor carryover. pt pulling up from walker and WW tipping. Ambulation  Ambulation?: Yes  More Ambulation?: No  Ambulation 1  Surface: level tile  Device: Rolling Walker  Assistance: Minimal assistance;Contact guard assistance  Quality of Gait: slow abner. VC for proximity of [employer], FF posture, downward gaze, increased lateral sway, WBOS, hesitant to follow VC. Distance: 15'x 2  Comments: few episdoes of LOB with Min A to correct                                         Activity Tolerance  Activity Tolerance: Patient Tolerated treatment well          ASSESSMENT     Pt educated on the importance of ambulating to the restroom when he needs to go rather than going in the bed. Pt did present with a few episodes of LOB during gait due to improper use of [employer] which required Min A to correct. Discharge Recommendations:  Continue to assess pending progress    Goals  Long term goals  Long term goal 1: Bed mobility with indep  Long term goal 2: Functional transfers with indep  Long term goal 3: Amb > 50ft with indep  Patient Goals   Patient goals : unable to state    PLAN    Times per week: 3-6  Safety Devices  Type of devices: Left in bed, Call light within reach, Bed alarm in place, Nurse notified     [facility] (6 CLICK) BASIC MOBILITY  AM-PAC Inpatient Mobility Raw Score : 16     Therapy Time   Individual   Time In 1008   Time Out 1031   Minutes 23         gait:15  Bm/transfer: [facility], [facility], 04/14/21 at 10:46 AM         Definitions for assistance levels  Independent = pt does not require any physical supervision or assistance from another person for activity completion. Device may be needed.   Stand by assistance = pt requires verbal cues or instructions from another person, close to but not touching, to perform the activity  Minimal assistance= pt performs 75% or more of the activity; assistance is required to complete the activity  Moderate assistance= pt performs 50% of the activity; assistance is required to complete the activity  Maximal assistance = pt performs 25% of the activity; assistance is required to complete the activity  Dependent = pt requires total physical assistance to accomplish the task

## 2021-04-14 NOTE — ONCOLOGY
solution, 12.5 g, Intravenous, PRN  glucagon (rDNA) injection 1 mg, 1 mg, Intramuscular, PRN  dextrose 5 % solution, 100 mL/hr, Intravenous, PRN  aspirin EC tablet 81 mg, 81 mg, Oral, Daily  atorvastatin (LIPITOR) tablet 40 mg, 40 mg, Oral, Nightly  clopidogrel (PLAVIX) tablet 75 mg, 75 mg, Oral, Daily  venlafaxine (EFFEXOR XR) extended release capsule 37.5 mg, 37.5 mg, Oral, Daily with breakfast  hydrALAZINE (APRESOLINE) injection 10 mg, 10 mg, Intravenous, Q6H PRN    PHYSICAL EXAM:    VITALS:  /78   Pulse 95   Temp 97.5 °F (36.4 °C) (Oral)   Resp 18   Ht 5' 9\" (1.753 m)   Wt 240 lb (108.9 kg)   SpO2 95%   BMI 35.44 kg/m²   INTAKE/OUTPUT:      Intake/Output Summary (Last 24 hours) at 4/14/2021 1349  Last data filed at 4/14/2021 0545  Gross per 24 hour   Intake 1853.7 ml   Output 850 ml   Net 1003.7 ml     CONSTITUTIONAL:  awake, alert, cooperative, no apparent distress, and appears stated age  ENT:  Normocephalic, without obvious abnormality. NECK:  Supple, symmetrical, trachea midline, no adenopathy, thyroid symmetric, not enlarged and no tenderness, skin normal  LUNGS:  No increased work of breathing, good air exchange, clear to auscultation bilaterally, no crackles or wheezing  CARDIOVASCULAR:  Normal apical impulse, regular rate and rhythm, normal S1 and S2, no S3 or S4, and no murmur noted  ABDOMEN:  No scars, normal bowel sounds, soft, non-distended, liver is palpable. CHEST/BREASTS:  no masses palpated, no axillary or supraclavicular adenopathy  MUSCULOSKELETAL:  There is no redness, warmth, or swelling of the joints. Full range of motion noted. Motor strength is 5 out of 5 all extremities bilaterally. Tone is normal.  NEUROLOGIC:  Awake, alert, oriented to name, place and time. Cranial nerves II-XII are grossly intact. Motor is 5 out of 5 bilaterally.    SKIN:  no bruising or bleeding and no rashes    DATA:      PT/INR:  No results found for: PTINR  PTT:  No results found for: APTT  CMP: Lab Results   Component Value Date     04/14/2021    K 3.9 04/14/2021     04/14/2021    CO2 19 04/14/2021    BUN 30 04/14/2021    PROT 6.5 04/10/2021     Magnesium:    Lab Results   Component Value Date    MG 1.3 04/12/2021     Phosphorus:  No components found for: PO4  Calcium:  No components found for: CA  CBC:    Lab Results   Component Value Date    WBC 5.4 04/14/2021    RBC 4.42 04/14/2021    HGB 12.9 04/14/2021    HCT 37.7 04/14/2021    MCV 85.2 04/14/2021    RDW 16.7 04/14/2021    PLT 86 04/14/2021     DIFF:    Lab Results   Component Value Date    MCV 85.2 04/14/2021    RDW 16.7 04/14/2021      LDH:  No results found for: LDH  Uric Acid:  No components found for: URIC    CBC with Differential:    Lab Results   Component Value Date    WBC 5.4 04/14/2021    RBC 4.42 04/14/2021    HGB 12.9 04/14/2021    HCT 37.7 04/14/2021    PLT 86 04/14/2021    MCV 85.2 04/14/2021    MCH 29.1 04/14/2021    MCHC 34.2 04/14/2021    RDW 16.7 04/14/2021    LYMPHOPCT 6.1 04/09/2021    MONOPCT 6.2 04/09/2021    BASOPCT 0.3 04/09/2021    MONOSABS 0.4 04/09/2021    LYMPHSABS 0.4 04/09/2021    EOSABS 0.0 04/09/2021    BASOSABS 0.0 04/09/2021     CMP:    Lab Results   Component Value Date     04/14/2021    K 3.9 04/14/2021     04/14/2021    CO2 19 04/14/2021    BUN 30 04/14/2021    CREATININE 1.39 04/14/2021    GFRAA >60.0 04/14/2021    LABGLOM 51.8 04/14/2021    GLUCOSE 89 04/14/2021    PROT 6.5 04/10/2021    LABALBU 2.89 04/10/2021    CALCIUM 10.1 04/14/2021    BILITOT 1.7 04/09/2021    ALKPHOS 142 04/09/2021    AST 74 04/09/2021    ALT 25 04/09/2021       RADIOLOGY RESULTS:  Ct Abdomen Pelvis Wo Contrast Additional Contrast? None    Result Date: 4/9/2021  EXAMINATION:  CT ABDOMEN PELVIS WO CONTRAST HISTORY:   Ascites, new onset weakness, fall, abdominal pain.  TECHNIQUE: Non-IV contrast imaging of the abdomen and pelvis was performed using standard technique, scanning from just above the dome of the diaphragm to the symphysis pubis. Including coronal and sagittal reconstructions. Unenhanced imaging is limited for the evaluation of some intra-abdominal and pelvic pathology. Contrast: IV: None Oral:  None. All CT scans at this facility use dose modulation, iterative reconstruction, and/or weight based dosing when appropriate to reduce radiation dose to as low as reasonably achievable. COMPARISON: None. RESULT: Abdomen / Pelvis: Liver: Cirrhotic liver morphology. Heterogeneous appearance of the liver. Assessment for hepatic lesions limited without contrast. Apparent low-attenuation lesions within the liver. Biliary: Extensive cholelithiasis. No distinct gallbladder wall thickening. Gallbladder mildly distended. Pancreas: Unremarkable. Spleen: Splenomegaly measuring around 15.0 cm. Adrenals: No mass. Kidneys: 3 mm calculus within the right lower pole. Punctate interpolar right-sided calculus. Other probable vascular calcifications. No hydronephrosis. No suspicious renal lesion within the unenhanced kidneys. GI Tract: Feces throughout the colon, especially within the rectal region. Diverticulosis, without evidence for diverticulitis. No small bowel dilation. Lymph Nodes: Multiple enlarged bulky mesenteric lymph nodes, with large conglomerate at the level of the mesenteric root. For example in the mid upper abdomen lymph node measuring approximately 3.7 x 1.9 cm (series 2 image 27). Enlarged retroperitoneal lymph nodes. For example para-aortic lymph node measuring 2.9 x 2.3 cm (series 2 image 44). Mesentery/peritoneum/retroperitoneum: Large volume ascites, especially involving the perihepatic and perisplenic regions, paracolic gutters, and tracking to the pelvis. No loculated collection. Vasculature: Mild arterial atherosclerotic disease without aneurysm. Apparent mesenteric collateral vessels. Pelvis: Ascites tracks into the pelvis. No bladder wall thickening. Small fat-containing periumbilical hernia.  Bones: No acute osseous findings. No destructive osseous lesions. Osteopenia. Bridging endplate osteophytes within the spine. Soft tissues: Anasarca. Lower thorax: Small bilateral pleural effusions. Coronary stent/calcifications. Cirrhotic liver morphology. Evaluation for liver lesions limited without contrast, apparent indeterminant low-attenuation lesions within the liver. Large volume ascites. Small pleural effusions. Anasarca. Mesenteric and retroperitoneal lymphadenopathy, nonspecific. Differential includes reactive process, infectious/inflammatory process, lymphoproliferative disorder (such as lymphoma), neoplastic process, etc. Feces throughout the colon, especially within the rectal region, suggestive of constipation, better assessed clinically. Right nephrolithiasis. ==========     Ct Head Wo Contrast    Result Date: 4/9/2021  EXAMINATION:  CT HEAD WO CONTRAST HISTORY:  Fall. Confusion. TECHNIQUE:  Serial axial images without IV contrast were obtained from the vertex to the foramen magnum. Sagittal and coronal reconstructions. All CT scans at this facility use dose modulation, iterative reconstruction, and/or weight based dosing when appropriate to reduce radiation dose to as low as reasonably achievable. COMPARISON:  CT 7/29/2018 RESULT: Acute change:   No evidence of an acute infarct or other acute parenchymal process. Hemorrhage:    No evidence of acute intracranial hemorrhage. Mass Lesion / Mass Effect:   There is no evidence of an intracranial mass or extraaxial fluid collection. No significant mass effect. Chronic change:   Scattered patchy foci of low attenuation are present within supratentorial white matter which is a nonspecific finding but likely represents mild microvascular ischemia. Parenchyma: There is mild to moderate generalized volume loss. Encephalomalacia adjacent to the right caudate, unchanged.  Ventricles:   Ventricular enlargement concordant with the degree of parenchymal volume loss. Paranasal sinuses and skull base:  Mild mucosal thickening within the ethmoid air cells and maxillary sinuses. Fluid within the right mastoid air cells. The skull base is unremarkable. Soft tissues unremarkable. No acute intracranial process. Xr Chest Portable    Result Date: 4/9/2021  EXAMINATION: CHEST PORTABLE VIEW  CLINICAL HISTORY: Weakness COMPARISONS: None  FINDINGS: Single  views of the chest is submitted. The cardiac silhouette is of normal size configuration. Pulmonary vascular unremarkable. Right sided trachea. Bibasilar areas atelectasis, patchy infiltrates in the bases. Trace/ small right pleural effusion  No Pneumothoraces. BIBASILAR AREAS ATELECTASIS, PATCHY INFILTRATES IN THE BASES. TRACE/SMALL RIGHT PLEURAL EFFUSION    Us Abdomen Limited Specify Organ? Liver, Gallbladder, Pancreas    Result Date: 4/9/2021  EXAMINATION:  US ABDOMEN LIMITED CLINICAL HISTORY:  cirrhosis, abd pain . Pepe Kymberly COMPARISONS:  NONE AVAILABLE TECHNIQUE:  Transabdominal ultrasound the rectal quadrant. FINDINGS:  The liver shows a a nodularity to the margin. This a nonspecific finding can be seen with diffuse infiltrative process or cirrhosis. There is a coarse heterogeneous echotexture to the parenchyma. No discrete masses are identified. No intrahepatic biliary  dilatation. The gallbladder shows no surrounding pericholecystic fluid. The wall is mildly thickened measuring 4 mm. There is hyperechoic foci with posterior acoustic shadowing likely represent a combination of cholelithiasis and gallbladder sludge. Common bile duct  measures 3 mm. There is diffuse ascites present. 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 1. THE LIVER IS NODULAR. THIS A NONSPECIFIC AND CAN BE SEEN WITH BENIGN DIFFUSELY INFILTRATIVE PROCESSES AND/OR CIRRHOSIS. . 2. THERE IS A DIFFUSE HETEROGENEOUS ECHOTEXTURE TO THE

## 2021-04-14 NOTE — PROGRESS NOTES
Vascular and Interventional Radiology   Matt Landaverde. Damon Billy M.D. [facility]      Chief Complaint:   Chief Complaint   Patient presents with    Hepatic Disease     ascites        Subjective: Alonso Lindo is a 63 y.o. male status post paracentesis yesterday. Patient is feeling well, feels relief of abdominal distention and pain which she had prior to the paracentesis procedure yesterday. Objective:   /75   Pulse 95   Temp 98.4 °F (36.9 °C) (Oral)   Resp 16   Ht 5' 9\" (1.753 m)   Wt 240 lb (108.9 kg)   SpO2 93%   BMI 35.44 kg/m²     Physical:   alert and  not in acute distress    Normocephalic, without obvious abnormality, atraumatic    Neck supple. No adenopathy. Thyroid symmetric, normal size, and without nodularity    normal rate, regular rhythm and no murmurs    chest clear, no wheezing, rales, normal symmetric air entry        Lab:  Recent Labs     04/13/21  0607   WBC 4.1*   RBC 3.98*   HGB 11.6*   HCT 33.8*   MCV 85.0   MCH 29.2   MCHC 34.4   RDW 16.5*   PLT 81*       Recent Labs     04/12/21  0909   INR 1.2   PROTIME 14.9       Recent Labs     04/11/21  0730 04/12/21  0623 04/13/21  0607   CREATININE 1.30* 1.33* 1.39*       Assessment: Alonso Lindo is a 63 y.o. male status post paracentesis. Patient is being followed by oncology service for possibility of intra-abdominal malignancy, now awaiting cytology results from ascites fluid. Patient feels improvement in symptoms after paracentesis yesterday and currently has no complaints. Plan: Nothing at the moment plan from interventional radiology. Patient will continue to follow with medical team and oncology due to suspicion of intra-abdominal malignancy. If further treatment or biopsies are needed, please feel free to consult us if we can be of any service. Thank you for the opportunity to care for Mr. Chase. Signing off. Matt Landaverde.  Damon Billy M.D. [facility]  4/13/2021,  11:07 PM

## 2021-04-14 NOTE — PROGRESS NOTES
[facility] here to get patient at 5.  Electronically signed by Hao Menjivar RN on 4/14/2021 at 7:54 PM

## 2021-04-15 NOTE — PROGRESS NOTES
Physical Therapy  Facility/Department: [facility] MED SURG [id number]  Physical Therapy Discharge      NAME: Ryan Chavarria    : 1960 (66 y.o.)  MRN: [id number]    Account: [de-identified]  Gender: male      Patient has been discharged from acute care hospital. DC patient from current PT program.      Electronically signed by Kimberly Brown, PT on 4/15/21 at 4:36 PM EDT

## 2021-04-15 NOTE — CARE COORDINATION
[facility] Update     4/15/2021    Patient: Harsha Cook Patient : 1960   MRN: [id number]  Reason for Admission: 2021 - 2021 Cirrhosis, Ascites.   Discharge Date: 21 RARS: Readmission Risk Score: 15  CT       Care Transitions Post Acute Facility Update    Care Transitions Interventions  Post Acute Facility: [facility] Update

## 2021-04-16 NOTE — PROGRESS NOTES
2021    TELEHEALTH EVALUATION -- Audio/Visual (During CIRHP-68 public health emergency)    Due to [place] 19 outbreak, patient's office visit was converted to a virtual visit. Patient was contacted and agreed to proceed with a virtual visit via Doxy. me  The risks and benefits of converting to a virtual visit were discussed in light of the current infectious disease epidemic. Patient also understood that insurance coverage and co-pays are up to their individual insurance plans. PROGRESS NOTE:     SUBJECTIVE:     Dearl Opitz (:  1960) has requested an audio/video evaluation for the following concern(s):  Hospital referral just prior to discharge from Oncology Dr. Lois Lomeli for evaluation of retroperitoneal lymphadenopathy and CT needle biopsy. Lymphadenopathy noted on Abdominal CT done for symptoms of abdominal pain. Dearl Opitz was seen by IR service during hospitalization for paracentesis of 7700 cc. Was discharged to [facility]. This VV done in conjunction with his RN Ruma Trejo whom Dearl Opitz gives permission to discuss care with. Symptoms include generalized weakness, decreased appetite. Denies abdominal distention or discomfort. He is to follow up with Dr. Quentin Maldonado oncology. No family history on file.     Past Surgical History:   Procedure Laterality Date    PARACENTESIS Left 2021    7715 mls removed  by Dr Olivia Radford       Past Medical History:   Diagnosis Date    Diabetes mellitus ([facility] [address]     Hypertension        Social History     Socioeconomic History    Marital status:      Spouse name: Not on file    Number of children: Not on file    Years of education: Not on file    Highest education level: Not on file   Occupational History    Not on file   Social Needs    Financial resource strain: Not on file    Food insecurity     Worry: Not on file     Inability: Not on file    Transportation needs     Medical: Not on file     Non-medical: Not on file   Tobacco Use    Smoking status: Current Every Day Smoker     Packs/day: 1.00     Types: E-Cigarettes    Smokeless tobacco: Never Used   Substance and Sexual Activity    Alcohol use: Yes     Comment: occassionally     Drug use: Yes     Types: Marijuana    Sexual activity: Not on file   Lifestyle    Physical activity     Days per week: Not on file     Minutes per session: Not on file    Stress: Not on file   Relationships    Social connections     Talks on phone: Not on file     Gets together: Not on file     Attends Congregation service: Not on file     Active member of club or organization: Not on file     Attends meetings of clubs or organizations: Not on file     Relationship status: Not on file    Intimate partner violence     Fear of current or ex partner: Not on file     Emotionally abused: Not on file     Physically abused: Not on file     Forced sexual activity: Not on file   Other Topics Concern    Not on file   Social History Narrative    Not on file       Allergies   Allergen Reactions    Codeine      Pt states he is not allergic        Current Outpatient Medications on File Prior to Visit   Medication Sig Dispense Refill    insulin lispro (HUMALOG) 100 UNIT/ML injection vial Inject 0-3 Units into the skin nightly 1 vial 3    insulin lispro (HUMALOG) 100 UNIT/ML injection vial Inject 0-6 Units into the skin 3 times daily (with meals) 1 vial 3    furosemide (LASIX) 20 MG tablet Take 1 tablet by mouth daily 60 tablet 3    spironolactone (ALDACTONE) 50 MG tablet Take 1 tablet by mouth daily 30 tablet 3    pantoprazole (PROTONIX) 40 MG tablet Take 1 tablet by mouth every morning (before breakfast) 30 tablet 3    albuterol sulfate HFA (VENTOLIN HFA) 108 (90 Base) MCG/ACT inhaler Inhale 2 puffs into the lungs every 6 hours as needed for Shortness of Breath       No current facility-administered medications on file prior to visit.         Review of Systems   Constitutional: Positive for appetite change (poor) and fatigue. HENT: Negative. Eyes: Negative. Respiratory: Negative. Cardiovascular: Negative. Gastrointestinal: Negative. Endocrine: Negative. Genitourinary: Negative. Musculoskeletal: Negative. Skin: Negative. Neurological: Positive for weakness (generalized). Hematological: Negative. Psychiatric/Behavioral: Negative. Prior to Visit Medications    Medication Sig Taking? Authorizing Provider   insulin lispro (HUMALOG) 100 UNIT/ML injection vial Inject 0-3 Units into the skin nightly  Kasandra Torres MD   insulin lispro (HUMALOG) 100 UNIT/ML injection vial Inject 0-6 Units into the skin 3 times daily (with meals)  Kasandra Torres, MD   furosemide (LASIX) 20 MG tablet Take 1 tablet by mouth daily  Kasandra Melissa, MD   spironolactone (ALDACTONE) 50 MG tablet Take 1 tablet by mouth daily  [place] Melissa, MD   pantoprazole (PROTONIX) 40 MG tablet Take 1 tablet by mouth every morning (before breakfast)  Kasandra Torres MD   albuterol sulfate HFA (VENTOLIN HFA) 108 (90 Base) MCG/ACT inhaler Inhale 2 puffs into the lungs every 6 hours as needed for Shortness of Breath  Historical Provider, MD       Social History     Tobacco Use    Smoking status: Current Every Day Smoker     Packs/day: 1.00     Types: E-Cigarettes    Smokeless tobacco: Never Used   Substance Use Topics    Alcohol use: Yes     Comment: occassionally     Drug use: Yes     Types: Marijuana          PHYSICAL EXAMINATION:  [ INSTRUCTIONS:  \"[x]\" Indicates a positive item  \"[]\" Indicates a negative item  -- DELETE ALL ITEMS NOT EXAMINED]  [x] Alert  [x] Oriented to person/place/time    [x] No apparent distress  [] Toxic appearing    Patient speaking in full sentences.    No apparent distress  Mood and behavior appropriate    [] Face flushed appearing [x] Sclera clear  [] Lips are cyanotic      [x] Breathing appears normal  [] Appears tachypneic      [] Rash on visible skin    [] Cranial Nerves II-XII grossly intact    [] Motor grossly intact in visible upper extremities    [] Motor grossly intact in visible lower extremities    [x] Normal Mood  [] Anxious appearing    [] Depressed appearing  [] Confused appearing      [] Poor short term memory  [] Poor long term memory    [] OTHER:      Due to this being a TeleHealth encounter, evaluation of the following organ systems is limited: Vitals/Constitutional/EENT/Resp/CV/GI//MS/Neuro/Skin/Heme-Lymph-Imm. Narrative   EXAMINATION:  CT ABDOMEN PELVIS WO CONTRAST       HISTORY:   Ascites, new onset weakness, fall, abdominal pain.       TECHNIQUE: Non-IV contrast imaging of the abdomen and pelvis was performed using standard technique, scanning from just above the dome of the diaphragm to the symphysis pubis. Including coronal and sagittal reconstructions.        Unenhanced imaging is limited for the evaluation of some intra-abdominal and pelvic pathology.       Contrast:   IV: None   Oral:  None.       All CT scans at this facility use dose modulation, iterative reconstruction, and/or weight based dosing when appropriate to reduce radiation dose to as low as reasonably achievable.       COMPARISON: None.           RESULT:       Abdomen / Pelvis:       Liver: Cirrhotic liver morphology. Heterogeneous appearance of the liver. Assessment for hepatic lesions limited without contrast. Apparent low-attenuation lesions within the liver.       Biliary: Extensive cholelithiasis. No distinct gallbladder wall thickening. Gallbladder mildly distended.        Pancreas: Unremarkable.       Spleen: Splenomegaly measuring around 15.0 cm.       Adrenals: No mass.        Kidneys: 3 mm calculus within the right lower pole. Punctate interpolar right-sided calculus. Other probable vascular calcifications. No hydronephrosis. No suspicious renal lesion within the unenhanced kidneys.        GI Tract: Feces throughout the colon, especially within the rectal region.  Diverticulosis, without evidence for diverticulitis. No small bowel dilation.        Lymph Nodes: Multiple enlarged bulky mesenteric lymph nodes, with large conglomerate at the level of the mesenteric root. For example in the mid upper abdomen lymph node measuring approximately 3.7 x 1.9 cm (series 2 image 27). Enlarged retroperitoneal    lymph nodes. For example para-aortic lymph node measuring 2.9 x 2.3 cm (series 2 image 44).     Mesentery/peritoneum/retroperitoneum: Large volume ascites, especially involving the perihepatic and perisplenic regions, paracolic gutters, and tracking to the pelvis. No loculated collection.       Vasculature: Mild arterial atherosclerotic disease without aneurysm. Apparent mesenteric collateral vessels.       Pelvis: Ascites tracks into the pelvis. No bladder wall thickening. Small fat-containing periumbilical hernia.       Bones: No acute osseous findings. No destructive osseous lesions.  Osteopenia. Bridging endplate osteophytes within the spine.       Soft tissues: Anasarca.       Lower thorax: Small bilateral pleural effusions. Coronary stent/calcifications.           Impression       Cirrhotic liver morphology. Evaluation for liver lesions limited without contrast, apparent indeterminant low-attenuation lesions within the liver.       Large volume ascites. Small pleural effusions. Anasarca.       Mesenteric and retroperitoneal lymphadenopathy, nonspecific. Differential includes reactive process, infectious/inflammatory process, lymphoproliferative disorder (such as lymphoma), neoplastic process, etc.       Feces throughout the colon, especially within the rectal region, suggestive of constipation, better assessed clinically.       Right nephrolithiasis. ASSESSMENT:    Chart, medications, lab work reviewed. 1. Retroperitoneal Lymphadenopathy of unknown etiology noted on CT during hospitalization. CT reviewed personally by myself and with Dr. Anne Anderson. PLAN:  1.  Percutaneous Ct Needle biopsy of OK enlarged lymph node with conscious sedation. Procedure with risks including infection, bleeding, pain at site, and possibility of inconclusive results discussed with patient. He wishes to proceed. 2. 1-2 week follow up for pathology results. An  electronic signature was used to authenticate this note. --[facility] ([place], APRN - CNP on 4/16/2021 at 11:37 AM        Pursuant to the emergency declaration under the [address] [place] [address], [facility] waiver authority and the [employer] Act, this Virtual  Visit was conducted, with patient's consent, to reduce the patient's risk of exposure to COVID-19 and provide continuity of care for an established patient. Services were provided through a video synchronous discussion virtually to substitute for in-person clinic visit. This billable evisit was conducted via Telehealth over phone or with video visit with doxy. me from [address] [place] [address], [address], via myself and the patient. It was explained to patient purpose of Telehealth visit to limit face to face contact due to Coronavirus mandates now in place. Patient verbalized agreement to participate in a billable Telehealth phone visit.

## 2021-04-19 NOTE — TELEPHONE ENCOUNTER
PATIENT WAS GIVEN THIS INFORMATION PRIOR TO LEAVING THE OFFICE / VIA PHONE CONVERSATION - VOICED UNDERSTANDING  >  YOUR PROCEDURE IS SCHEDULED ON: 04/20/21 @ 10  >  You will need to arrive at 9 and check in at the Diagnostic Imaging Check In desk.   >  Do not eat or drink after midnight or  hours before the scheduled procedure time. Sips of water is acceptable ONLY to take medications. >  Make arrangements for transportation, as you should not drive immediately after.  > You need to call [phone], option 2, to pre-register for the procedure the day before it is scheduled.   > PT [address]

## 2021-04-23 NOTE — SEDATION DOCUMENTATION
SEDATION ADMINISTERED    [id number] Patient positionedprone on CT table. 1011 Vitals Monitor applied. VSS. 1017 CT scans done. 56 Timeout completed for Percutaneous cat scan guided needle biopsy of retroperitoneal lymph node with conscious sedation. 1021 Versed 1 mg IV and Fentanyl 100 mcg IV sedation administered. 26 Dr Deya Gallegos reviewed scans, mid back site marked, prepped with Chloraprep and draped with sterile drape and towels. 1025 Skin numbed with Lidocaine 2% by Dr Deya Gallegos. 1028 Spinal needle 22 g by 3-1/2 in placed by Dr. Deya Gallegos. 1031 Lidocaine 2% 10 ml given through spinal needle. Spinal needle withdrawn. 1032 CT Fluoro guidance used to place 19 g x 15.1 cm introducer. 1038 core biopsy needle 20 g x 20cm used to obtain a total of 3 samples. Two samples placed into formalin. One sample placed in RPMI tube  Verbal and tactile reassurance provided throughout. 1039 Introducer withdrawn, pressure held then neosporin and bandaid applied. Patient tolerated well. 1044 Specimen taken to lab. Patient taken to CT Holding Room for Recovery.     Total Sedation Given:  Versed:     1 mg       Fentanyl:    100 mcg

## 2021-04-23 NOTE — PROGRESS NOTES
Patient brought to CT holding via cart     1051 Drink provided. 1054 Warms blankets provided. Patient denies any pain. 1100 Spoke with nurse Luis Daniel Aragon at [facility] and notified her patient is doing well and to send transport for  at [phone]. 1115 Patient drinking diet pepsi. Patient denies any needs at this time. 1126 Patient's VSS. Patient denies any pain at this time. 1138 Patient resting with eyes closed respirations even and unlabored. 1204 Patient's VSS. Patient denies any pain or any needs at this time. Bala alfonso provided.

## 2021-05-01 PROBLEM — G93.41 METABOLIC ENCEPHALOPATHY: Status: ACTIVE | Noted: 2021-01-01

## 2021-05-01 NOTE — H&P
Hospital Medicine  History and Physical    Patient:  Nicholes Opitz  MRN: [id number]    CHIEF COMPLAINT:    Chief Complaint   Patient presents with    Altered Mental Status       History Obtained From:  Patient, EMR  Primary Care Physician: Carleen Siemens, MD    HISTORY OF PRESENT ILLNESS:   58-year-old male with history of prior CVA, recent diagnosis of liver cirrhosis with ascites, metastatic carcinoma based on lymph node biopsy (4/23) with unclear primary presents from [facility] after family found him to have significant mental status change. The patient cannot provide any subjective history. He will not talk or follow any commands. He is spontaneously moving all extremities. Per discussion with the ED provider, the family was not aware that he had any cancer and they state the patient was walking and talking normally prior to his last hospitalization. They are shocked at how ill-appearing the patient is and how confused he is. I called emergency contact Virginie Granados (nephew- [phone])-SI is not sure what has happened over the last month. Prior to the last last month, he had been mentally slow due to CVAs he had sustained approximately 2 years prior-he was capable of simple but not complex discussion. He had trouble controlling his profanities. He had tremor of his right hand and moved very slowly. Acacia Duarte stated that the patient has adult children however he is mostly estranged from them. The main person looking after the patient is Kain's sister (the patient's nephew).     Past Medical History:      Diagnosis Date    Diabetes mellitus ([facility] [address]     Hypertension        Past Surgical History:      Procedure Laterality Date    CT BIOPSY ABDOMEN RETROPERITONEUM  4/23/2021    CT BIOPSY ABDOMEN RETROPERITONEUM 4/23/2021 [facility] CT SCAN    PARACENTESIS Left 04/12/2021    7715 mls removed  by Dr Deya Gallegos       Medications Prior to Admission:    Prior to Admission medications    Medication Sig Start Date End Date Taking? Authorizing Provider   insulin lispro (HUMALOG) 100 UNIT/ML injection vial Inject 0-3 Units into the skin nightly 4/14/21   Kasandra Torres MD   insulin lispro (HUMALOG) 100 UNIT/ML injection vial Inject 0-6 Units into the skin 3 times daily (with meals) 4/14/21   Kasandra Torres MD   furosemide (LASIX) 20 MG tablet Take 1 tablet by mouth daily 4/15/21   Kasandra Torres MD   spironolactone (ALDACTONE) 50 MG tablet Take 1 tablet by mouth daily 4/15/21   Kasandra Torres MD   pantoprazole (PROTONIX) 40 MG tablet Take 1 tablet by mouth every morning (before breakfast) 4/15/21   Kasandra Torres MD   albuterol sulfate HFA (VENTOLIN HFA) 108 (90 Base) MCG/ACT inhaler Inhale 2 puffs into the lungs every 6 hours as needed for Shortness of Breath    Historical Provider, MD       Allergies:  Codeine    Social History:   TOBACCO:   reports that he has been smoking e-cigarettes. He has been smoking about 1.00 pack per day. He has never used smokeless tobacco.  ETOH:   reports current alcohol use. Family History:   History reviewed. No pertinent family history. REVIEW OF SYSTEMS:  Ten systems reviewed and negative except for stated in HPI    Physical Exam:    Vitals: /87   Pulse 102   Temp 97 °F (36.1 °C) (Oral)   Resp 15   Ht 5' 9\" (1.753 m)   Wt 260 lb (117.9 kg)   SpO2 95%   BMI 38.40 kg/m²   General appearance: Lethargic. Does not talk or follow commands. He is moving all extremities and looking around. Skin: Skin color, texture, turgor normal. No rashes or lesions  HEENT: eomi, perrla. MMM  Neck: No JVD or lymphadenopathy  Lungs: Diminished at bases  Heart: RRR, no murmur or gallp  Abdomen: Distended but soft and nontender. Bsx4.  No masses or organomegaly  Extremities: 2+ pitting edema bilaterally  Neurologic: No focal deficits     Recent Labs     05/01/21  1425   WBC 6.1   HGB 14.1   *     Recent Labs     05/01/21  1425   *   K 4.5      CO2 17*   BUN 56* CREATININE 1.86*   GLUCOSE 136*   AST 40   ALT 18   BILITOT 3.0*   ALKPHOS 236*     Troponin T: No results for input(s): TROPONINI in the last 72 hours. ABGs: No results found for: PHART, PO2ART, WXY9EAW  INR: No results for input(s): INR in the last 72 hours. URINALYSIS:  Recent Labs     05/01/21  1330   COLORU ORANGE*   PHUR 5.0   WBCUA 0-2   RBCUA 10-20*   BACTERIA Negative   CLARITYU CLOUDY*   SPECGRAV 1.021   LEUKOCYTESUR SMALL*   UROBILINOGEN 1.0   BILIRUBINUR MODERATE*   BLOODU LARGE*   GLUCOSEU Negative     -----------------------------------------------------------------   Ct Head Wo Contrast    Result Date: 5/1/2021  CT Brain. Contrast medium:  without contrast.. History:  Confusion. Technical factors: CT imaging of the brain was obtained and formatted as 5 mm contiguous axial images. 2.5 mm contiguous axial images were obtained through the osseous structures. Sagittal and coronal reconstruction obtained during postprocessing. Comparison:  April 9, 2021, July 29, 2018. Findings: Extra-axial spaces:  Normal. Intracranial hemorrhage:  None. Ventricular system: Ventricles mildly enlarged. Sulci mildly prominent. Basal Cisterns:  Normal. Cerebral Parenchyma: Bilateral symmetric periventricular areas decreased attenuation. 5 x 12 mm area decreased attenuation, exerting no mass effect, right basal ganglia, unchanged. Midline Shift:  None. Cerebellum:  Normal. Paranasal sinuses and mastoid air cells:  Normal. Visualized Orbits:  Normal.     Impression: Remote right basal ganglia infarct. Chronic ischemic white matter disease. Mild cerebral atrophy. All CT scans at this facility use dose modulation, iterative reconstruction, and/or weight based dosing when appropriate to reduce radiation dose to as low as reasonably achievable.           Assessment and Plan     70-year-old male with history of prior CVA, recent diagnosis of liver cirrhosis with ascites, metastatic carcinoma based on lymph node biopsy (4/23)

## 2021-05-01 NOTE — ED TRIAGE NOTES
Pt to ed from [facility] with reports of altered mental status. Per family, pt is not acting as self.

## 2021-05-01 NOTE — ED NOTES
Pt was straight-cathed to obtain urine specimen; 1000mls of danial urine received.       Jeffrey Dennis RN  05/01/21 1667

## 2021-05-01 NOTE — ED PROVIDER NOTES
negative. PAST MEDICAL HISTORY     Past Medical History:   Diagnosis Date    Diabetes mellitus ([address]     Hypertension          SURGICALHISTORY       Past Surgical History:   Procedure Laterality Date    CT BIOPSY ABDOMEN RETROPERITONEUM  4/23/2021    CT BIOPSY ABDOMEN RETROPERITONEUM 4/23/2021 [facility] CT SCAN    PARACENTESIS Left 04/12/2021    7715 mls removed  by Dr Misty Anderson       Previous Medications    ALBUTEROL SULFATE HFA (VENTOLIN HFA) 108 (90 BASE) MCG/ACT INHALER    Inhale 2 puffs into the lungs every 6 hours as needed for Shortness of Breath    FUROSEMIDE (LASIX) 20 MG TABLET    Take 1 tablet by mouth daily    INSULIN LISPRO (HUMALOG) 100 UNIT/ML INJECTION VIAL    Inject 0-3 Units into the skin nightly    INSULIN LISPRO (HUMALOG) 100 UNIT/ML INJECTION VIAL    Inject 0-6 Units into the skin 3 times daily (with meals)    PANTOPRAZOLE (PROTONIX) 40 MG TABLET    Take 1 tablet by mouth every morning (before breakfast)    SPIRONOLACTONE (ALDACTONE) 50 MG TABLET    Take 1 tablet by mouth daily       ALLERGIES     Codeine    FAMILY HISTORY     History reviewed. No pertinent family history.        SOCIAL HISTORY       Social History     Socioeconomic History    Marital status:      Spouse name: None    Number of children: None    Years of education: None    Highest education level: None   Occupational History    None   Social Needs    Financial resource strain: None    Food insecurity     Worry: None     Inability: None    Transportation needs     Medical: None     Non-medical: None   Tobacco Use    Smoking status: Current Every Day Smoker     Packs/day: 1.00     Types: E-Cigarettes    Smokeless tobacco: Never Used   Substance and Sexual Activity    Alcohol use: Yes     Comment: occassionally     Drug use: Yes     Types: Marijuana    Sexual activity: None   Lifestyle    Physical activity     Days per week: None     Minutes per session: None    Stress: None interpreted by the emergency physician with the below findings:        Interpretation per the Radiologist below, if available at the time ofthis note:    CT Head WO Contrast   Final Result   Impression:      Remote right basal ganglia infarct. Chronic ischemic white matter disease. Mild cerebral atrophy. All CT scans at this facility use dose modulation, iterative reconstruction, and/or weight based dosing when appropriate to reduce radiation dose to as low as reasonably achievable.             ED BEDSIDE ULTRASOUND:   Performed by ED Physician - none    LABS:  Labs Reviewed   COMPREHENSIVE METABOLIC PANEL - Abnormal; Notable for the following components:       Result Value    Sodium 133 (*)     CO2 17 (*)     Glucose 136 (*)     BUN 56 (*)     CREATININE 1.86 (*)     GFR Non- 37.0 (*)     GFR  44.8 (*)     Albumin 2.9 (*)     Total Bilirubin 3.0 (*)     Alkaline Phosphatase 236 (*)     Globulin 4.9 (*)     All other components within normal limits   CBC WITH AUTO DIFFERENTIAL - Abnormal; Notable for the following components:    RDW 17.1 (*)     Platelets 039 (*)     Lymphocytes Absolute 0.5 (*)     All other components within normal limits   URINE RT REFLEX TO CULTURE - Abnormal; Notable for the following components:    Color, UA ORANGE (*)     Clarity, UA CLOUDY (*)     Bilirubin Urine MODERATE (*)     Blood, Urine LARGE (*)     Protein, UA 30 (*)     Nitrite, Urine POSITIVE (*)     Leukocyte Esterase, Urine SMALL (*)     All other components within normal limits   AMMONIA - Abnormal; Notable for the following components:    Ammonia 313 (*)     All other components within normal limits   MICROSCOPIC URINALYSIS - Abnormal; Notable for the following components:    RBC, UA 10-20 (*)     All other components within normal limits   CULTURE, BLOOD 2   CULTURE, BLOOD 1   CULTURE, URINE   COVID-19, RAPID   ETHANOL   POCT EPOC BLOOD GAS, LACTIC ACID, ICA       All other labs were within normal range or not returned as of this dictation. EMERGENCY DEPARTMENT COURSE and DIFFERENTIAL DIAGNOSIS/MDM:   Vitals:    Vitals:    05/01/21 1328 05/01/21 1530 05/01/21 1615 05/01/21 1645   BP: 110/88 111/78 120/87    Pulse: 123 104 107 102   Resp: 21 14 16 15   Temp: 97 °F (36.1 °C)      TempSrc: Oral      SpO2: 97% 97% 98% 95%   Weight: 260 lb (117.9 kg)      Height: 5' 9\" (1.753 m)                   MDM  Number of Diagnoses or Management Options  Acute UTI  Metabolic encephalopathy  Metastatic carcinoma (HCC)  Diagnosis management comments: 63years old known history of hepatic encephalopathy metastatic recent diagnosis of metastatic carcinoma have not been seen by oncology yet confirmed by recent biopsy. Presented to the ER with increased confusion lethargy was found to have a UTI elevated ammonia level. Family knee specifically wanted the patient to be admitted for further placement        Amount and/or Complexity of Data Reviewed  Tests in the radiology section of CPT®: ordered and reviewed    Critical Care  Total time providing critical care: 30-74 minutes    Patient Progress  Patient progress: stable        CONSULTS:  None    PROCEDURES:  Unless otherwise noted below, none     Procedures    FINAL IMPRESSION      1. Metabolic encephalopathy    2. Acute UTI    3. Metastatic carcinoma ([facility] [address]          DISPOSITION/PLAN   DISPOSITION        PATIENT REFERRED TO:  No follow-up provider specified.     DISCHARGE MEDICATIONS:  New Prescriptions    No medications on file          (Please note thatportions of this note were completed with a voice recognition program.  Efforts were made to edit the dictations but occasionally words are mis-transcribed.)    Alon Jacobo MD (electronically signed)  Attending Emergency Physician          Tatianna Chamberlain MD  05/01/21 [phone]

## 2021-05-02 NOTE — CARE COORDINATION
COPIED FROM 4/9 CMI. PT UNABLE TO COMMUNICATE AT THIS TIME. PT D/C'D TO [facility] ON 4/23. [facility] EMERGENCY [facility] AT [place] [facility]   Initial Discharge Assessment    Met with patient and his niece Kat at bedside to discuss patient's baseline status, available resources and support system, and potential discharge plan. PCP: Hugo Steel MD                                  Date of Last Visit:  April 2021  If no PCP, list provided? N/A    Discharge Planning    Living Arrangements: Patient lives independently at home in a studio apartment with elevator access and no stairs to enter. Who do you live with? Patient lives alone. Who helps you with your care: At baseline, patient had been independent with occasional assist (i.e. with transportation) from his niece or nephew. If lives at home:  Do you have any barriers navigating in your home? No    Patient can perform ADL? Yes    Current Services (outpatient and in home) :  None    Dialysis: No    Is transportation available to get to your appointments? Yes - Niezra Castro can transport and patient drives at baseline. DME Equipment:  None    Respiratory equipment: None    Respiratory provider:  RASHAAD     Pharmacy:  [facility] with Medication Assistance Program?  No      Patient agreeable to [facility]? Yes, Company - List provided and discussed with patient and his niece. Patient agreeable to SNF/Rehab? No    Other discharge needs identified? Other - TBD based on work-up and needs at AL. Patient would benefit from [address] for nursing and therapy. Freedom of choice list provided with basic dialogue that supports the patient's individualized plan of care/goals and shares the quality data associated with the providers. Yes    Does Patient Have a High-Risk for Readmission Diagnosis (CHF, PN, MI, COPD)? No     History: DM, htn, CVA, ascites.     The plan for Transition of Care is related to the following treatment goals:
Helena Castro, son phoned and states that he is his father's next of kin. He states that his cousin Kat is POA and he wants nothing to do with it. He states he will sign whatever paperwork. Notified ANDREW Pierce. Spiritual care consult placed.
MET WITH FAMILY AND . FAMILY REQUESTS [facility] G[facility]RIELA, 1ST CHO[facility] [address] AS 2ND CHOICE. REFERRAL FAXED TO Mike Toure -[phone] AND SHE NOTIFIED.  AWAITING RESPONSE
MET WITH MCKINLEY AND FAMILY WITH [facility]. SNF LIST AND THIS CM CARD GIVEN TO EACH FAMILY MEMBER. AWAITING CHOICES FOR SNF WITH HOSPICE CARE. THEY REFUSE TO GO BACK TO [facility] AND PREFER NOT TO GO TO [facility]. HELP NOTIFIED AND LEFT VOICEMAIL TO NOTIFY [facility] FOR MEDICAID ASSISTANCE.
SPOKE WITH CLIFF AT [place] [id number]. SHE TO CONTACT FAMILY.
This CM had previously interviewed patient for ACP and CMI purposes 4/9/2021. Today patient is unable to answer questions. He was brought to ER from [facility]. per family's decision. Additional details regarding reason for transfer to ER available in admitting physician's note from this date. CM left message with patient's primary  Kat re: patient's assigned room number and contact info for 4W at 1800.
WDL

## 2021-05-02 NOTE — CONSULTS
Spiritual Care Services     Summary of Visit:   visited to address an advanced directive consult. Patient was laying in bed and though his eyes were awake he was not responsive. After discussing patient's condition with nurse it is clear that patient's condition would not allow for further discussions related to advanced directives. Matter addressed by physician talking with family and family deciding on a hospice referral.    Spiritual Assessment/Intervention/Outcomes:    Encounter Summary  Services provided to[de-identified] Patient  Referral/Consult From[de-identified] Nurse  Support System: Children, Family members  Volunteer Visit: No  Complexity of Encounter: Moderate  Length of Encounter: 30 minutes                 Advance Directives (For Healthcare)  Pre-existing DNR Comfort Care/DNR Arrest/DNI Order: No  Healthcare Directive: No, patient does not have an advance directive for healthcare treatment  Information on Healthcare Directives Requested: No  Patient Requests Assistance: Yes, referral made to   Advance Directives: Unable to complete                Care Plan:        [id number] [address]   Electronically signed by Kelly Sparrow on 5/2/21 at 12:15 PM EDT     To reach a  for emotional and spiritual support, place an [facility] consult request.   If a  is needed immediately, dial 0 and ask to page the on-call .

## 2021-05-02 NOTE — FLOWSHEET NOTE
Patient admitted to 4W. Vitals stable. Pt responds to painful stimuli and moans occasionally. Medicated per MAR. Dasilva inserted for retention per orders. I contacted [facility] for medication list. Dr. Perales Police notified of med list completion. Patient checked and turned frequently throughout the night.

## 2021-05-02 NOTE — DISCHARGE INSTR - COC
Continuity of Care Form    Patient Name: Ry Ashby   :  1960  MRN:  [id number]    Admit date:  2021  Discharge date:  ***    Code Status Order: [facility]   Advance Directives:   [address] [facility] [facility] Documentation     Date/Time Healthcare Directive Type of Healthcare Directive Copy in [address] Agent's Name Healthcare Agent's Phone Number    21 2327  No, patient does not have an advance directive for healthcare treatment -- -- -- -- --          Admitting Physician:  Shaye Rueda DO  PCP: Evita Ramirez MD    Discharging Nurse: [facility] Unit/Room#: E502/U362-66  Discharging Unit Phone Number: ***    Emergency Contact:   Extended Emergency Contact Information  Primary Emergency Contact: Laney Oliver of [address] Phone: [phone]  Relation: Niece/Nephew  Secondary Emergency Contact: Heavenly Valenzuela  [address] Phone: [phone]  Relation: Niece/Nephew    Past Surgical History:  Past Surgical History:   Procedure Laterality Date    CT BIOPSY ABDOMEN RETROPERITONEUM  2021    CT BIOPSY ABDOMEN RETROPERITONEUM 2021 [facility] CT SCAN    PARACENTESIS Left 2021    7715 mls removed  by Dr Tamanna Decker       Immunization History: There is no immunization history on file for this patient.     Active Problems:  Patient Active Problem List   Diagnosis Code    Acute cerebrovascular accident (CVA) ([facility] [address] I63.9    Depression F32.9    Abdominal pain B70.6    Metabolic encephalopathy Z60.73       Isolation/Infection:   Isolation          No Isolation        Patient Infection Status     None to display          Nurse Assessment:  Last Vital Signs: /78   Pulse 62   Temp 97.9 °F (36.6 °C) (Oral)   Resp 20   Ht 5' 9\" (1.753 m)   Wt 260 lb (117.9 kg)   SpO2 97%   BMI 38.40 kg/m²     Last documented pain score (0-10 scale): Pain Level: 0  Last Weight:   Wt Readings from Last 1 Encounters:   21 260 lb (117.9 kg) Mental Status:  {IP PT MENTAL STATUS:}    IV Access:  { SHORTY IV ACCESS:492964099}    Nursing Mobility/ADLs:  Walking   {P DME TNYC:556079516}  Transfer  {P DME BCRI:344515537}  Bathing  {CHP DME LGOH:125316466}  Dressing  {P DME JNAH:419146045}  Toileting  {P DME AOWW:105652206}  Feeding  {[facility] DME RNOZ:824746981}  Med Admin  {P DME FTJP:362404048}  Med Delivery   { SHORTY MED Delivery:807713799}    Wound Care Documentation and Therapy:        Elimination:  Continence:   · Bowel: {YES / VX:30412}  · Bladder: {YES / KQ:53969}  Urinary Catheter: {Urinary Catheter:643529421}   Colostomy/Ileostomy/Ileal Conduit: {YES / II:62492}       Date of Last BM: ***    Intake/Output Summary (Last 24 hours) at 2021 1127  Last data filed at 2021 0502  Gross per 24 hour   Intake 568 ml   Output 150 ml   Net 418 ml     I/O last 3 completed shifts:   In: 568 [I.V.:368; IV Piggyback:200]  Out: 150 [Urine:150]    Safety Concerns:     [employer] Safety Concerns:099803945}    Impairments/Disabilities:      [employer] Impairments/Disabilities:007037796}    Nutrition Therapy:  Current Nutrition Therapy:   [employer] Diet List:702086111}    Routes of Feeding: {[facility] DME Other Feedings:652759214}  Liquids: {Slp liquid thickness:85162}  Daily Fluid Restriction: {P DME Yes amt example:397308330}  Last Modified Barium Swallow with Video (Video Swallowing Test): {Done Not Done SYQJ:424504124}    Treatments at the Time of Hospital Discharge:   Respiratory Treatments: ***  Oxygen Therapy:  {Therapy; copd oxygen:72943}  Ventilator:    { CC Vent PRKD:619468671}    Rehab Therapies: {THERAPEUTIC INTERVENTION:3472397154}  Weight Bearing Status/Restrictions: [address]  Weight Bearin}  Other Medical Equipment (for information only, NOT a DME order):  {EQUIPMENT:391376285}  Other Treatments: ***    Patient's personal belongings (please select all that are sent with patient):  {GERRY DME Belongings:832782379}    RN SIGNATURE: {Esignature:182895360}    CASE MANAGEMENT/SOCIAL WORK SECTION    Inpatient Status Date: ***    Readmission Risk Assessment Score:  Readmission Risk              Risk of Unplanned Readmission:        15           Discharging to Facility/ Agency   · Name: [facility]  · Address:  · Phone:[phone]  · Fax:    Dialysis Facility (if applicable)   · Name:  · Address:  · Dialysis Schedule:  · Phone:  · Fax:    / signature: Electronically signed by Keily Singh RN on 5/2/21 at 2:50 PM EDT    PHYSICIAN SECTION    Prognosis: {Prognosis:3332284703}    Condition at Discharge: [address] [place] [address] Patient Condition:979543275}    Rehab Potential (if transferring to Rehab): {Prognosis:6175142940}    Recommended Labs or Other Treatments After Discharge: ***    Physician Certification: I certify the above information and transfer of Dearl Opitz  is necessary for the continuing treatment of the diagnosis listed and that he requires {Admit to Appropriate Level of Care:90189} for {GREATER/LESS:720525023} 30 days.      Update Admission H&P: {CHP DME Changes in YCGFT:693298871}    PHYSICIAN SIGNATURE:  {Esignature:639567045}

## 2021-05-02 NOTE — PROGRESS NOTES
1320:Met with pt's Niezra Han and pt's 2 sisters to review hospice care and services. Reviewed with them hospice care at home vs in-pt hospice center vs nursing facility. Plan is for pt to go to [facility] or [facility] [address], and medicaid to be looked into on Monday.  NL will f/u with 2W on Monday to see what needs are at that time
Patient assessed and charted on by this RN. Orientation GEORGE. Withdraws from pain. Stomach is very distended. +2 pitting edema in legs. Q2 turns. Medium bowel movement. Given lactulose rectal. Tolerated well. Fall precautions are in place. Dasilva in place. Call light in reach. 1000 - [facility] updated on patient condition. 1125 - Patient washed up with PCA, rolled Q2. Medium bowel movement. 1618 - Continuing to monitor. Patient rolled and checked does not appear to be any distress at this time.
05/02/21  0614   AST 40 36   ALT 18 15   BILITOT 3.0* 2.6*   ALKPHOS 236* 209*       Assessment and Plan:        27-year-old male with history of prior CVA, recent diagnosis of liver cirrhosis with ascites, metastatic carcinoma based on lymph node biopsy (4/23) with unclear primary presents from [facility] after family found him to have significant mental status change-confused, nonverbal, not following commands.     1. Metabolic encephalopathy: Suspect hepatic encephalopathy due to decompensated cirrhosis. Also related to urinary retention  -Comfort care strategy: Supportive care     2. Metastatic carcinoma with unclear primary:  -I performed 30 minutes of advanced care planning with the patient's POA Kat (confirmed with son that Kat is POA). She has elected to transition to comfort care strategy-hospice consult placed     3. Decompensated cirrhosis in patient with known portal vein thrombosis and malignancy    4. Acute kidney injury     CKD 3  Functional debility  Obesity  History of CVA  Thrombocytopenia due to liver disease  Portal vein thrombosis     Poor prognosis. Will discharge with hospice once arranged. [facility]     45 minutes in total care time.      Electronically signed by Lindbergh Fleischer, DO on 5/2/2021 at 2:59 PM

## 2021-05-02 NOTE — CONSULTS
member of club or organization: Not on file     Attends meetings of clubs or organizations: Not on file     Relationship status: Not on file    Intimate partner violence     Fear of current or ex partner: Not on file     Emotionally abused: Not on file     Physically abused: Not on file     Forced sexual activity: Not on file   Other Topics Concern    Not on file   Social History Narrative    Not on file            Current Facility-Administered Medications   Medication Dose Route Frequency Provider Last Rate Last Admin    morphine (PF) injection 1 mg  1 mg Intravenous Q4H PRN Jamar Knutsoner, DO        sodium chloride flush 0.9 % injection 5-40 mL  5-40 mL Intravenous 2 times per day Jamar Knutsoner, DO   10 mL at 05/01/21 2152    sodium chloride flush 0.9 % injection 5-40 mL  5-40 mL Intravenous PRN Jamar Knutsoner, DO        0.9 % sodium chloride infusion  25 mL Intravenous PRN Jamar Knutsoner, DO        acetaminophen (TYLENOL) tablet 650 mg  650 mg Oral Q6H PRN Lindbergh Fleischer, DO        Or    acetaminophen (TYLENOL) suppository 650 mg  650 mg Rectal Q6H PRN Lindbergh Fleischer, DO         [unfilled]  Allergies   Allergen Reactions    Codeine      Pt states he is not allergic         Review of Systems  Not able to answer questions. PHYSICAL EXAMINATION:   VITAL SIGNS: /78   Pulse 62   Temp 97.9 °F (36.6 °C) (Oral)   Resp 20   Ht 5' 9\" (1.753 m)   Wt 260 lb (117.9 kg)   SpO2 97%   BMI 38.40 kg/m²         GENERAL: In no acute distress, well- nourished, well- developed,alert and oriented to person place and time. SKIN: Warm and dry, withoutjaundice, ecchymoses, or petechiae. HEENT: Normocephalic, sclera anicteric, oral mucosa moist without lesion or exudate in the visible oral cavity or oropharynx, tongue mid-line with good mobility and no deviation with extension.   NODES: No palpable adenopathy in the neck Levels I-V, bilateral   Supraclavicular fossae, axillary chains, or inguinal regions. LUNGS: Good inspiratory effort, no accessory muscle use, clear bilaterally, no focal wheeze, rales or rhonchi. CARDIAC: Regular rate and rhythm, without murmurs, rubs or gallops. ABDOMINAL: Normal bowel soundspresent, soft, non-tender, no mass or  organomegaly. MUSKL:     LAB RESULTS:  Recent Results (from the past 24 hour(s))   Ammonia    Collection Time: 05/01/21  3:44 PM   Result Value Ref Range    Ammonia 313 (H) 16 - 60 umol/L   SARS-CoV-2 NAAT (Rapid)    Collection Time: 05/01/21  5:51 PM    Specimen: Nasopharyngeal Swab   Result Value Ref Range    SARS-CoV-2, NAAT Not Detected Not Detected   POCT Arterial    Collection Time: 05/01/21  5:52 PM   Result Value Ref Range    POC Sodium 136 136 - 145 mEq/L    POC Potassium 3.8 3.5 - 5.1 mEq/L    POC Chloride 104 99 - 110 mEq/L    POC Glucose 172 (H) 60 - 115 mg/dl    POC Creatinine 2.6 (H) 0.8 - 1.3 mg/dL    GFR Non-African American 25 (A) >60    GFR  30 (A) >60    Calcium, Ion 1.20 1. 12 - 1.32 mmol/L    pH, Arterial 7.460 (H) 7.350 - 7.450    pCO2, Arterial 25 (L) 35 - 45 mm Hg    pO2, Arterial 87 (HH) 75 - 108 mm Hg    HCO3, Arterial 18.1 (L) 21.0 - 29.0 mmol/L    Base Excess, Arterial -6 (L) -3 - 3    O2 Sat, Arterial 97 (HH) 93 - 100 %    TCO2, Arterial 19 (L) 22 - 29    Lactate 3.31 (H) 0.40 - 2.00 mmol/L    POC Hematocrit 40 (L) 41 - 53 %    Hemoglobin 13.5 13.5 - 17.5 gm/dL    FIO2 21.000     Sample Type ART     Performed on SEE BELOW    EKG 12 Lead    Collection Time: 05/01/21  9:16 PM   Result Value Ref Range    Ventricular Rate 122 BPM    Atrial Rate 122 BPM    P-R Interval 134 ms    QRS Duration 62 ms    Q-T Interval 340 ms    QTc Calculation (Bazett) 484 ms    P Axis 67 degrees    R Axis 30 degrees    T Axis 31 degrees   CBC auto differential    Collection Time: 05/02/21  6:14 AM   Result Value Ref Range    WBC 4.9 4.8 - 10.8 K/uL    RBC 4.56 (L) 4.70 - 6.10 M/uL    Hemoglobin 13.2 (L) 14.0 - 18.0 g/dL    Hematocrit 40.0 (L) 42.0 - 52.0 %    MCV 87.6 80.0 - 100.0 fL    MCH 29.0 27.0 - 31.3 pg    MCHC 33.1 33.0 - 37.0 %    RDW 17.4 (H) 11.5 - 14.5 %    Platelets 066 (L) 216 - 400 K/uL    Neutrophils % 72.4 %    Lymphocytes % 11.6 %    Monocytes % 14.4 %    Eosinophils % 1.4 %    Basophils % 0.2 %    Neutrophils Absolute 3.5 1.4 - 6.5 K/uL    Lymphocytes Absolute 0.6 (L) 1.0 - 4.8 K/uL    Monocytes Absolute 0.7 0.2 - 0.8 K/uL    Eosinophils Absolute 0.1 0.0 - 0.7 K/uL    Basophils Absolute 0.0 0.0 - 0.2 K/uL   Comprehensive Metabolic Panel w/ Reflex to MG    Collection Time: 05/02/21  6:14 AM   Result Value Ref Range    Sodium 135 135 - 144 mEq/L    Potassium reflex Magnesium 4.4 3.4 - 4.9 mEq/L    Chloride 101 95 - 107 mEq/L    CO2 15 (L) 20 - 31 mEq/L    Anion Gap 19 (H) 9 - 15 mEq/L    Glucose 153 (H) 70 - 99 mg/dL    BUN 66 (H) 8 - 23 mg/dL    CREATININE 2.75 (H) 0.70 - 1.20 mg/dL    GFR Non-African American 23.6 (L) >60    GFR  28.5 (L) >60    Calcium 8.8 8.5 - 9.9 mg/dL    Total Protein 6.9 6.3 - 8.0 g/dL    Albumin 2.6 (L) 3.5 - 4.6 g/dL    Total Bilirubin 2.6 (H) 0.2 - 0.7 mg/dL    Alkaline Phosphatase 209 (H) 35 - 104 U/L    ALT 15 0 - 41 U/L    AST 36 0 - 40 U/L    Globulin 4.3 (H) 2.3 - 3.5 g/dL   Phosphorus    Collection Time: 05/02/21  6:14 AM   Result Value Ref Range    Phosphorus 3.2 2.3 - 4.8 mg/dL     Recent Labs     05/01/21  1330 05/01/21  1330 05/02/21  0614   COLORU ORANGE*  --   --    PHUR 5.0  --   --    WBCUA 0-2  --   --    RBCUA 10-20*  --   --    BACTERIA Negative  --   --    CLARITYU CLOUDY*  --   --    SPECGRAV 1.021  --   --    LEUKOCYTESUR SMALL*  --   --    UROBILINOGEN 1.0  --   --    BILIRUBINUR MODERATE*  --   --    BLOODU LARGE*  --   --    GLUCOSE  --    < > 153*    < > = values in this interval not displayed.         Pathology:     RADIOLOGY RESULTS:  Ct Abdomen Pelvis Wo Contrast Additional Contrast? None    Result Date: 4/9/2021  EXAMINATION:  CT ABDOMEN PELVIS WO CONTRAST HISTORY:   Ascites, new onset weakness, fall, abdominal pain. TECHNIQUE: Non-IV contrast imaging of the abdomen and pelvis was performed using standard technique, scanning from just above the dome of the diaphragm to the symphysis pubis. Including coronal and sagittal reconstructions. Unenhanced imaging is limited for the evaluation of some intra-abdominal and pelvic pathology. Contrast: IV: None Oral:  None. All CT scans at this facility use dose modulation, iterative reconstruction, and/or weight based dosing when appropriate to reduce radiation dose to as low as reasonably achievable. COMPARISON: None. RESULT: Abdomen / Pelvis: Liver: Cirrhotic liver morphology. Heterogeneous appearance of the liver. Assessment for hepatic lesions limited without contrast. Apparent low-attenuation lesions within the liver. Biliary: Extensive cholelithiasis. No distinct gallbladder wall thickening. Gallbladder mildly distended. Pancreas: Unremarkable. Spleen: Splenomegaly measuring around 15.0 cm. Adrenals: No mass. Kidneys: 3 mm calculus within the right lower pole. Punctate interpolar right-sided calculus. Other probable vascular calcifications. No hydronephrosis. No suspicious renal lesion within the unenhanced kidneys. GI Tract: Feces throughout the colon, especially within the rectal region. Diverticulosis, without evidence for diverticulitis. No small bowel dilation. Lymph Nodes: Multiple enlarged bulky mesenteric lymph nodes, with large conglomerate at the level of the mesenteric root. For example in the mid upper abdomen lymph node measuring approximately 3.7 x 1.9 cm (series 2 image 27). Enlarged retroperitoneal lymph nodes. For example para-aortic lymph node measuring 2.9 x 2.3 cm (series 2 image 44). Mesentery/peritoneum/retroperitoneum: Large volume ascites, especially involving the perihepatic and perisplenic regions, paracolic gutters, and tracking to the pelvis. No loculated collection.  Vasculature: Mild arterial atherosclerotic disease without aneurysm. Apparent mesenteric collateral vessels. Pelvis: Ascites tracks into the pelvis. No bladder wall thickening. Small fat-containing periumbilical hernia. Bones: No acute osseous findings. No destructive osseous lesions. Osteopenia. Bridging endplate osteophytes within the spine. Soft tissues: Anasarca. Lower thorax: Small bilateral pleural effusions. Coronary stent/calcifications. Cirrhotic liver morphology. Evaluation for liver lesions limited without contrast, apparent indeterminant low-attenuation lesions within the liver. Large volume ascites. Small pleural effusions. Anasarca. Mesenteric and retroperitoneal lymphadenopathy, nonspecific. Differential includes reactive process, infectious/inflammatory process, lymphoproliferative disorder (such as lymphoma), neoplastic process, etc. Feces throughout the colon, especially within the rectal region, suggestive of constipation, better assessed clinically. Right nephrolithiasis. ==========     Ct Head Wo Contrast    Result Date: 5/1/2021  CT Brain. Contrast medium:  without contrast.. History:  Confusion. Technical factors: CT imaging of the brain was obtained and formatted as 5 mm contiguous axial images. 2.5 mm contiguous axial images were obtained through the osseous structures. Sagittal and coronal reconstruction obtained during postprocessing. Comparison:  April 9, 2021, July 29, 2018. Findings: Extra-axial spaces:  Normal. Intracranial hemorrhage:  None. Ventricular system: Ventricles mildly enlarged. Sulci mildly prominent. Basal Cisterns:  Normal. Cerebral Parenchyma: Bilateral symmetric periventricular areas decreased attenuation. 5 x 12 mm area decreased attenuation, exerting no mass effect, right basal ganglia, unchanged. Midline Shift:  None. Cerebellum:  Normal. Paranasal sinuses and mastoid air cells:  Normal. Visualized Orbits:  Normal.     Impression: Remote right basal ganglia infarct.  Chronic ischemic white matter disease. Mild cerebral atrophy. All CT scans at this facility use dose modulation, iterative reconstruction, and/or weight based dosing when appropriate to reduce radiation dose to as low as reasonably achievable. Ct Head Wo Contrast    Result Date: 4/9/2021  EXAMINATION:  CT HEAD WO CONTRAST HISTORY:  Fall. Confusion. TECHNIQUE:  Serial axial images without IV contrast were obtained from the vertex to the foramen magnum. Sagittal and coronal reconstructions. All CT scans at this facility use dose modulation, iterative reconstruction, and/or weight based dosing when appropriate to reduce radiation dose to as low as reasonably achievable. COMPARISON:  CT 7/29/2018 RESULT: Acute change:   No evidence of an acute infarct or other acute parenchymal process. Hemorrhage:    No evidence of acute intracranial hemorrhage. Mass Lesion / Mass Effect:   There is no evidence of an intracranial mass or extraaxial fluid collection. No significant mass effect. Chronic change:   Scattered patchy foci of low attenuation are present within supratentorial white matter which is a nonspecific finding but likely represents mild microvascular ischemia. Parenchyma: There is mild to moderate generalized volume loss. Encephalomalacia adjacent to the right caudate, unchanged. Ventricles:   Ventricular enlargement concordant with the degree of parenchymal volume loss. Paranasal sinuses and skull base:  Mild mucosal thickening within the ethmoid air cells and maxillary sinuses. Fluid within the right mastoid air cells. The skull base is unremarkable. Soft tissues unremarkable. No acute intracranial process. Ct Guided Needle Placement    1. Successful core biopsy of left retroperitoneal periaortic lymphadenopathy. 2.  Specimens were obtained and sent for microbiology and flow cytometry genetic testing. HISTORY: Alexandra Delcid is a Male of 63 years age.  DIAGNOSIS:  R59.0 Retroperitoneal lymphadenopathy ICD10 COMPARISON: None available. CT Dose-Length Product (estimate related to radiation exposure from this exam):  1628.26  mGy*cm. PROCEDURE: Following the discussion of the procedure, alternatives, risks versus benefits, informed consent was obtained from the patient. Specifically, risks of after-biopsy pain at the site, rare possibility of excessive hemorrhage, infection, injury to the adjacent organs were discussed and the patient verbalized understanding. Pre-procedure evaluation confirmed that the patient was an appropriate candidate for conscious sedation. Adequate sedation was maintained during the entire procedure. Vital signs, pulse oximetry, and response to verbal commands were monitored and recorded by the nurse throughout the procedure and the recovery period. Medical information was entered in the medical record including the medications and dosages used. The patient returned to baseline neurologic and physiologic status prior to leaving the department. No immediate sedation related complications were noted. Medication for conscious sedation was administered via IV route. 45 minutes of conscious sedation was provided. Following universal protocol, patient and site verification was performed with a \"timeout\" prior to the procedure. The patient was placed on the CT table in prone position and the left flank area was prepped and draped in usual sterile fashion. Using the usual sterile conditions, lidocaine and CT guidance, the periaortic left retroperitoneal enlarged lymph node was accessed using an 20-guage Temno coaxial biopsy needle system. After confirmation of appropriate localization of the needle, total of 3 samples were obtained and sent for pathological and flow cytometry analysis. The coaxial needle system was removed and hemostasis was achieved by direct digital compression. The patient tolerated the procedure well. No immediate complications identified.   The patient left the CT suite in supine position to Ken Wallace is a Male of 63 years age. DIAGNOSIS:  R59.0 Retroperitoneal lymphadenopathy ICD10 COMPARISON: None available. CT Dose-Length Product (estimate related to radiation exposure from this exam):  1628.26  mGy*cm. PROCEDURE: Following the discussion of the procedure, alternatives, risks versus benefits, informed consent was obtained from the patient. Specifically, risks of after-biopsy pain at the site, rare possibility of excessive hemorrhage, infection, injury to the adjacent organs were discussed and the patient verbalized understanding. Pre-procedure evaluation confirmed that the patient was an appropriate candidate for conscious sedation. Adequate sedation was maintained during the entire procedure. Vital signs, pulse oximetry, and response to verbal commands were monitored and recorded by the nurse throughout the procedure and the recovery period. Medical information was entered in the medical record including the medications and dosages used. The patient returned to baseline neurologic and physiologic status prior to leaving the department. No immediate sedation related complications were noted. Medication for conscious sedation was administered via IV route. 45 minutes of conscious sedation was provided. Following universal protocol, patient and site verification was performed with a \"timeout\" prior to the procedure. The patient was placed on the CT table in prone position and the left flank area was prepped and draped in usual sterile fashion. Using the usual sterile conditions, lidocaine and CT guidance, the periaortic left retroperitoneal enlarged lymph node was accessed using an 20-guage Temno coaxial biopsy needle system. After confirmation of appropriate localization of the needle, total of 3 samples were obtained and sent for pathological and flow cytometry analysis. The coaxial needle system was removed and hemostasis was achieved by direct digital compression.   The patient tolerated the procedure well. No immediate complications identified. The patient left the CT suite in supine position to the recovery room in stable condition. Total local anesthetic used: lidocaine, approximately 8 mL. Estimated blood loss:  None. The patient was observed in the recovery room for two hours after the procedure and discharged in stable condition. .     ASSESSMENT AND PLAN  1. Metastatic carcinoma . Etiology is not clear, although AFP was elevated. Performance status is poor and will not be a candidate for any treatment. Family has decided to [employer] as he does not qualify for in house hospice. 2. Cirrhosis with encephalopathy.      Electronically signed by Amarilys Landaverde MD on 5/2/2021 at 2:37 PM

## 2021-05-02 NOTE — ACP (ADVANCE CARE PLANNING)
differences between Advance Directives and portable DNR orders. Length of ACP Conversation in minutes:      Conversation Outcomes:  [x] ACP discussion completed  [] Existing advance directive reviewed with patient; no changes to patient's previously recorded wishes  [] New Advance Directive completed  [] Portable Do Not Rescitate prepared for Provider review and signature  [] POLST/POST/MOLST/MOST prepared for Provider review and signature      Follow-up plan:    [] Schedule follow-up conversation to continue planning  [x] Referred individual to Provider for additional questions/concerns   [] Advised patient/agent/surrogate to review completed ACP document and update if needed with changes in condition, patient preferences or care setting    [x] This note routed to one or more involved healthcare providers.

## 2021-05-03 LAB
EKG ATRIAL RATE: 122 BPM
EKG P AXIS: 67 DEGREES
EKG P-R INTERVAL: 134 MS
EKG Q-T INTERVAL: 340 MS
EKG QRS DURATION: 62 MS
EKG QTC CALCULATION (BAZETT): 484 MS
EKG R AXIS: 30 DEGREES
EKG T AXIS: 31 DEGREES
EKG VENTRICULAR RATE: 122 BPM
PTH RELATED PEPTIDE: 23.8 PMOL/L (ref 0–2.3)
URINE CULTURE, ROUTINE: NORMAL

## 2021-05-03 NOTE — DISCHARGE SUMMARY
[facility] Medicine Discharge Summary    Ry Ashby  :  1960  MRN:  [id number]    Admit date:  2021    Date of expiration:  2021    Admitting Physician:  Shaye Rueda DO  Primary Care Physician:  Evita Ramirez MD    Discharge Diagnoses: Active Problems:    Metabolic encephalopathy  Resolved Problems:    * No resolved hospital problems. *    Chief Complaint   Patient presents with    Altered Mental Status       Condition:     Significant Findings:     Labs Renal Latest Ref Rng & Units 2021   BUN 8 - 23 mg/dL 66(H) 56(H) 35(H) 25(H) 30(H)   Cr 0.70 - 1.20 mg/dL 2.75(H) 1.86(H) 1.61(H) 1.87(H) 1.39(H)   K 3.4 - 4.9 mEq/L 4.4 4.5 3.5 3.7 3.9   Na 135 - 144 mEq/L 135 133(L) 134(L) 135 135         Hospital Course:   67-year-old male with history of prior CVA, recent diagnosis of liver cirrhosis with ascites, metastatic carcinoma based on lymph node biopsy () with unclear primary presented from [facility] after family found him to have significant mental status change-confused, nonverbal, not following commands. He was found to have metabolic encephalopathy suspected to be hepatic encephalopathy due to decompensated cirrhosis. Due to his poor functional status and multiple comorbidities, family elected to transition to comfort care. He  on the evening of 5/2. May Mr. Connor Faust rest in peace.        Signed:  Shaye Rueda  5/3/2021, 11:32 AM

## 2021-05-06 LAB
BLOOD CULTURE, ROUTINE: NORMAL
CULTURE, BLOOD 2: NORMAL

## 2021-05-12 NOTE — DISCHARGE SUMMARY
obtained and formatted as 5 mm contiguous axial images. 2.5 mm contiguous axial images were obtained through the osseous structures. Sagittal and coronal reconstruction obtained during postprocessing. Comparison:  April 9, 2021, July 29, 2018. Findings: Extra-axial spaces:  Normal. Intracranial hemorrhage:  None. Ventricular system: Ventricles mildly enlarged. Sulci mildly prominent. Basal Cisterns:  Normal. Cerebral Parenchyma: Bilateral symmetric periventricular areas decreased attenuation. 5 x 12 mm area decreased attenuation, exerting no mass effect, right basal ganglia, unchanged. Midline Shift:  None. Cerebellum:  Normal. Paranasal sinuses and mastoid air cells:  Normal. Visualized Orbits:  Normal.     Impression: Remote right basal ganglia infarct. Chronic ischemic white matter disease. Mild cerebral atrophy. All CT scans at this facility use dose modulation, iterative reconstruction, and/or weight based dosing when appropriate to reduce radiation dose to as low as reasonably achievable. Xr Chest Portable    Result Date: 5/2/2021  EXAMINATION: XR CHEST PORTABLE CLINICAL HISTORY: ALTERED MENTAL STATUS COMPARISONS: APRIL 9, 2021 FINDINGS: Patient leaning to left and rotated to left. Osseous structures intact. Cardiopericardial silhouette normal. Pulmonary vasculature normal. Small horizontal band of increased opacity left costophrenic angle.      LEFT LUNG SCARRING VERSUS SUBSEGMENTAL ATELECTASIS/PNEUMONIA      Discharge Medications:       [address] Medication Instructions TRN:[id number]    Printed on:05/12/21 0758   Medication Information                      albuterol sulfate HFA (VENTOLIN HFA) 108 (90 Base) MCG/ACT inhaler  Inhale 2 puffs into the lungs every 6 hours as needed for Shortness of Breath             furosemide (LASIX) 20 MG tablet  Take 1 tablet by mouth daily             insulin lispro (HUMALOG) 100 UNIT/ML injection vial  Inject 0-3 Units into the skin nightly insulin lispro (HUMALOG) 100 UNIT/ML injection vial  Inject 0-6 Units into the skin 3 times daily (with meals)             pantoprazole (PROTONIX) 40 MG tablet  Take 1 tablet by mouth every morning (before breakfast)             spironolactone (ALDACTONE) 50 MG tablet  Take 1 tablet by mouth daily                 Disposition:   If discharged to Home, Any [facility] needs that were indicated and/or required as been addressed and set up by Social Work. Condition at discharge: Pt was medically stable at the time of discharge. Activity: activity as tolerated    Total time taken for discharging this patient: 40 minutes. Greater than 70% of time was spent focused exclusively on this patient. Time was taken to review chart, discuss plans with consultants, reconciling medications, discussing plan answering questions with patient.      Signed:  Columba Anderson

## 2021-07-14 NOTE — ED NOTES
Bed: 18  Expected date:   Expected time:   Means of arrival:   Comments:  60[facility] from [facility], AMS, 122/74, 100NSR, 100RA, [facility] Richard Gu, ANDREW  05/01/21 0280 Scheduling:  cln w/ mac w/ Dr. Kenna Vazquez  Dx: h/o cln polyps  trilyte sent e-scribe  (sub miralax/gatorade if trilyte not avail.  suprep used in past, but does not appear to be covered with his current plan)    Hold lisinopril day of if w/ mac